# Patient Record
Sex: FEMALE | Race: WHITE | URBAN - METROPOLITAN AREA
[De-identification: names, ages, dates, MRNs, and addresses within clinical notes are randomized per-mention and may not be internally consistent; named-entity substitution may affect disease eponyms.]

---

## 2017-04-27 RX ORDER — TRAMADOL HYDROCHLORIDE 50 MG/1
TABLET ORAL
Qty: 120 TABLET | Refills: 0 | Status: SHIPPED | OUTPATIENT
Start: 2017-04-27 | End: 2017-08-07 | Stop reason: SDUPTHER

## 2017-08-07 RX ORDER — CYCLOBENZAPRINE HCL 10 MG
10 TABLET ORAL 3 TIMES DAILY PRN
Qty: 30 TABLET | Refills: 2 | Status: SHIPPED | OUTPATIENT
Start: 2017-08-07 | End: 2017-10-24 | Stop reason: SDUPTHER

## 2017-08-07 RX ORDER — TRAMADOL HYDROCHLORIDE 50 MG/1
TABLET ORAL
Qty: 120 TABLET | Refills: 0 | Status: SHIPPED | OUTPATIENT
Start: 2017-08-07 | End: 2017-09-01 | Stop reason: SDUPTHER

## 2017-08-31 ENCOUNTER — TELEPHONE (OUTPATIENT)
Dept: FAMILY MEDICINE CLINIC | Age: 62
End: 2017-08-31

## 2017-09-05 RX ORDER — TRAMADOL HYDROCHLORIDE 50 MG/1
TABLET ORAL
Qty: 120 TABLET | Refills: 0 | Status: SHIPPED | OUTPATIENT
Start: 2017-09-05 | End: 2017-10-24 | Stop reason: SDUPTHER

## 2017-09-12 ENCOUNTER — OFFICE VISIT (OUTPATIENT)
Dept: FAMILY MEDICINE CLINIC | Age: 62
End: 2017-09-12
Payer: COMMERCIAL

## 2017-09-12 VITALS
DIASTOLIC BLOOD PRESSURE: 72 MMHG | HEIGHT: 70 IN | HEART RATE: 87 BPM | SYSTOLIC BLOOD PRESSURE: 134 MMHG | BODY MASS INDEX: 28.49 KG/M2 | WEIGHT: 199 LBS

## 2017-09-12 DIAGNOSIS — I10 ESSENTIAL HYPERTENSION: ICD-10-CM

## 2017-09-12 DIAGNOSIS — M79.7 FIBROMYALGIA: Primary | ICD-10-CM

## 2017-09-12 PROCEDURE — 99213 OFFICE O/P EST LOW 20 MIN: CPT | Performed by: FAMILY MEDICINE

## 2017-09-12 RX ORDER — LISINOPRIL AND HYDROCHLOROTHIAZIDE 25; 20 MG/1; MG/1
1 TABLET ORAL DAILY
Qty: 90 TABLET | Refills: 3 | Status: SHIPPED | OUTPATIENT
Start: 2017-09-12 | End: 2018-12-16 | Stop reason: SDUPTHER

## 2017-09-12 ASSESSMENT — PATIENT HEALTH QUESTIONNAIRE - PHQ9
SUM OF ALL RESPONSES TO PHQ QUESTIONS 1-9: 0
1. LITTLE INTEREST OR PLEASURE IN DOING THINGS: 0
2. FEELING DOWN, DEPRESSED OR HOPELESS: 0
SUM OF ALL RESPONSES TO PHQ9 QUESTIONS 1 & 2: 0

## 2017-09-12 ASSESSMENT — ENCOUNTER SYMPTOMS
SORE THROAT: 0
NAUSEA: 0
SINUS PRESSURE: 0
COUGH: 0
SHORTNESS OF BREATH: 0
BACK PAIN: 1
VOMITING: 0
DIARRHEA: 0

## 2017-10-25 RX ORDER — TRAMADOL HYDROCHLORIDE 50 MG/1
TABLET ORAL
Qty: 120 TABLET | Refills: 0 | Status: SHIPPED | OUTPATIENT
Start: 2017-10-25 | End: 2018-01-22 | Stop reason: SDUPTHER

## 2017-10-25 RX ORDER — CYCLOBENZAPRINE HCL 10 MG
10 TABLET ORAL 3 TIMES DAILY PRN
Qty: 30 TABLET | Refills: 2 | Status: SHIPPED | OUTPATIENT
Start: 2017-10-25 | End: 2019-10-17 | Stop reason: SDUPTHER

## 2018-01-23 DIAGNOSIS — M79.7 FIBROMYALGIA: ICD-10-CM

## 2018-01-23 RX ORDER — TRAMADOL HYDROCHLORIDE 50 MG/1
TABLET ORAL
Qty: 120 TABLET | Refills: 0 | Status: SHIPPED | OUTPATIENT
Start: 2018-01-23 | End: 2018-01-23 | Stop reason: SDUPTHER

## 2018-01-23 RX ORDER — TRAMADOL HYDROCHLORIDE 50 MG/1
TABLET ORAL
Qty: 120 TABLET | Refills: 0 | Status: SHIPPED | OUTPATIENT
Start: 2018-01-23 | End: 2018-01-24 | Stop reason: SDUPTHER

## 2018-01-23 NOTE — TELEPHONE ENCOUNTER
Last refill 11-6-2017   Health Maintenance   Topic Date Due    Hepatitis C screen  1955    HIV screen  03/05/1972    DTaP/Tdap/Td vaccine (1 - Tdap) 03/05/1976    Pneumococcal med risk (1 of 1 - PPSV23) 03/05/1976    Cervical cancer screen  03/05/1978    Breast cancer screen  03/05/2007    Colon cancer screen colonoscopy  03/05/2007    Smoker: low dose lung CT screening  03/05/2012    Zostavax vaccine  03/05/2017    Flu vaccine (1) 09/01/2017    Potassium monitoring  10/26/2017    Creatinine monitoring  10/26/2017    Lipid screen  10/26/2021       No results found for: LABA1C          ( goal A1C is < 7)   No results found for: LABMICR  LDL Cholesterol (mg/dL)   Date Value   10/26/2016 105       (goal LDL is <100)   AST (U/L)   Date Value   10/26/2016 21     ALT (U/L)   Date Value   10/26/2016 32     BUN (mg/dL)   Date Value   10/26/2016 11     BP Readings from Last 3 Encounters:   09/12/17 134/72   10/24/16 142/88          (goal 120/80)    All Future Testing planned in [facility]      Next Visit Date:  No future appointments.          Patient Active Problem List:     Essential hypertension     Fibromyalgia

## 2018-01-23 NOTE — TELEPHONE ENCOUNTER
Health Maintenance   Topic Date Due    Hepatitis C screen  1955    HIV screen  03/05/1972    DTaP/Tdap/Td vaccine (1 - Tdap) 03/05/1976    Pneumococcal med risk (1 of 1 - PPSV23) 03/05/1976    Cervical cancer screen  03/05/1978    Breast cancer screen  03/05/2007    Colon cancer screen colonoscopy  03/05/2007    Smoker: low dose lung CT screening  03/05/2012    Zostavax vaccine  03/05/2017    Flu vaccine (1) 09/01/2017    Potassium monitoring  10/26/2017    Creatinine monitoring  10/26/2017    Lipid screen  10/26/2021       No results found for: LABA1C          ( goal A1C is < 7)   No results found for: LABMICR  LDL Cholesterol (mg/dL)   Date Value   10/26/2016 105       (goal LDL is <100)   AST (U/L)   Date Value   10/26/2016 21     ALT (U/L)   Date Value   10/26/2016 32     BUN (mg/dL)   Date Value   10/26/2016 11     BP Readings from Last 3 Encounters:   09/12/17 134/72   10/24/16 142/88          (goal 120/80)    All Future Testing planned in [facility]      Next Visit Date:  No future appointments.          Patient Active Problem List:     Essential hypertension     Fibromyalgia

## 2018-01-24 DIAGNOSIS — M79.7 FIBROMYALGIA: ICD-10-CM

## 2018-01-24 RX ORDER — TRAMADOL HYDROCHLORIDE 50 MG/1
TABLET ORAL
Qty: 120 TABLET | Refills: 0 | Status: SHIPPED | OUTPATIENT
Start: 2018-01-24 | End: 2018-02-23

## 2018-05-02 DIAGNOSIS — M79.7 FIBROMYALGIA: ICD-10-CM

## 2018-05-02 RX ORDER — TRAMADOL HYDROCHLORIDE 50 MG/1
TABLET ORAL
Qty: 120 TABLET | Refills: 0 | Status: SHIPPED | OUTPATIENT
Start: 2018-05-02 | End: 2018-06-26 | Stop reason: SDUPTHER

## 2018-06-26 DIAGNOSIS — M79.7 FIBROMYALGIA: ICD-10-CM

## 2018-06-26 RX ORDER — TRAMADOL HYDROCHLORIDE 50 MG/1
TABLET ORAL
Qty: 120 TABLET | Refills: 0 | Status: SHIPPED | OUTPATIENT
Start: 2018-06-26 | End: 2018-07-26

## 2018-07-05 ENCOUNTER — TELEPHONE (OUTPATIENT)
Dept: FAMILY MEDICINE CLINIC | Age: 63
End: 2018-07-05

## 2018-09-14 ENCOUNTER — OFFICE VISIT (OUTPATIENT)
Dept: FAMILY MEDICINE CLINIC | Age: 63
End: 2018-09-14
Payer: COMMERCIAL

## 2018-09-14 VITALS
BODY MASS INDEX: 24.36 KG/M2 | HEART RATE: 100 BPM | HEIGHT: 71 IN | WEIGHT: 174 LBS | DIASTOLIC BLOOD PRESSURE: 92 MMHG | SYSTOLIC BLOOD PRESSURE: 140 MMHG

## 2018-09-14 DIAGNOSIS — Z23 IMMUNIZATION DUE: ICD-10-CM

## 2018-09-14 DIAGNOSIS — Z12.39 BREAST CANCER SCREENING: ICD-10-CM

## 2018-09-14 DIAGNOSIS — M79.7 FIBROMYALGIA: ICD-10-CM

## 2018-09-14 DIAGNOSIS — I10 ESSENTIAL HYPERTENSION: Primary | ICD-10-CM

## 2018-09-14 PROCEDURE — 90471 IMMUNIZATION ADMIN: CPT | Performed by: FAMILY MEDICINE

## 2018-09-14 PROCEDURE — 90688 IIV4 VACCINE SPLT 0.5 ML IM: CPT | Performed by: FAMILY MEDICINE

## 2018-09-14 PROCEDURE — 99213 OFFICE O/P EST LOW 20 MIN: CPT | Performed by: FAMILY MEDICINE

## 2018-09-14 RX ORDER — TRAMADOL HYDROCHLORIDE 50 MG/1
50 TABLET ORAL EVERY 6 HOURS PRN
COMMUNITY
End: 2018-09-28

## 2018-09-14 RX ORDER — CITALOPRAM 20 MG/1
20 TABLET ORAL EVERY EVENING
Qty: 30 TABLET | Refills: 3 | Status: SHIPPED | OUTPATIENT
Start: 2018-09-14 | End: 2020-09-10 | Stop reason: ALTCHOICE

## 2018-09-14 ASSESSMENT — PATIENT HEALTH QUESTIONNAIRE - PHQ9
2. FEELING DOWN, DEPRESSED OR HOPELESS: 0
SUM OF ALL RESPONSES TO PHQ QUESTIONS 1-9: 0
1. LITTLE INTEREST OR PLEASURE IN DOING THINGS: 0
SUM OF ALL RESPONSES TO PHQ9 QUESTIONS 1 & 2: 0
SUM OF ALL RESPONSES TO PHQ QUESTIONS 1-9: 0

## 2018-09-14 ASSESSMENT — ENCOUNTER SYMPTOMS
DIARRHEA: 0
COUGH: 0
VOMITING: 0
NAUSEA: 0
SINUS PRESSURE: 0
BACK PAIN: 1
SORE THROAT: 0
SHORTNESS OF BREATH: 0

## 2018-09-14 NOTE — PROGRESS NOTES
FRACTURE SURGERY      TUBAL LIGATION       Family History   Problem Relation Age of Onset    Cancer Mother     Heart Disease Mother     Stroke Father      Social History   Substance Use Topics    Smoking status: Current Every Day Smoker     Packs/day: 1.00     Types: Cigarettes    Smokeless tobacco: Never Used    Alcohol use No      Current Outpatient Prescriptions   Medication Sig Dispense Refill    traMADol (ULTRAM) 50 MG tablet Take 50 mg by mouth every 6 hours as needed for Pain. Fili Schaeffer citalopram (CELEXA) 20 MG tablet Take 1 tablet by mouth every evening 30 tablet 3    cyclobenzaprine (FLEXERIL) 10 MG tablet Take 1 tablet by mouth 3 times daily as needed for Muscle spasms 30 tablet 2    lisinopril-hydrochlorothiazide (PRINZIDE;ZESTORETIC) 20-25 MG per tablet Take 1 tablet by mouth daily 90 tablet 3     No current facility-administered medications for this visit. No Known Allergies      Subjective:   Review of Systems   Constitutional: Negative for chills, diaphoresis and fever. HENT: Negative for congestion, sinus pressure and sore throat. Eyes: Negative for visual disturbance. Respiratory: Negative for cough and shortness of breath. Cardiovascular: Negative for chest pain and leg swelling. Gastrointestinal: Negative for diarrhea, nausea and vomiting. Genitourinary: Negative for dysuria, menstrual problem, urgency and vaginal discharge. Musculoskeletal: Positive for back pain and gait problem. Negative for arthralgias and myalgias. Skin: Negative for rash. Neurological: Negative for weakness, numbness and headaches. Psychiatric/Behavioral: Positive for agitation, decreased concentration and sleep disturbance. The patient is nervous/anxious. Objective:   BP (!) 140/92 (Site: Left Upper Arm)   Pulse 100   Ht 5' 11\" (1.803 m)   Wt 174 lb (78.9 kg)   BMI 24.27 kg/m²     Physical Exam   Constitutional: She is oriented to person, place, and time.  She appears

## 2018-09-24 ENCOUNTER — TELEPHONE (OUTPATIENT)
Dept: FAMILY MEDICINE CLINIC | Age: 63
End: 2018-09-24

## 2018-09-28 ENCOUNTER — OFFICE VISIT (OUTPATIENT)
Dept: FAMILY MEDICINE CLINIC | Age: 63
End: 2018-09-28
Payer: COMMERCIAL

## 2018-09-28 VITALS
HEART RATE: 72 BPM | DIASTOLIC BLOOD PRESSURE: 70 MMHG | WEIGHT: 176 LBS | SYSTOLIC BLOOD PRESSURE: 120 MMHG | BODY MASS INDEX: 24.55 KG/M2

## 2018-09-28 DIAGNOSIS — F41.1 GAD (GENERALIZED ANXIETY DISORDER): ICD-10-CM

## 2018-09-28 DIAGNOSIS — I10 ESSENTIAL HYPERTENSION: Primary | ICD-10-CM

## 2018-09-28 DIAGNOSIS — M79.7 FIBROMYALGIA: ICD-10-CM

## 2018-09-28 PROCEDURE — 99213 OFFICE O/P EST LOW 20 MIN: CPT | Performed by: FAMILY MEDICINE

## 2018-09-28 RX ORDER — LORAZEPAM 0.5 MG/1
TABLET ORAL
Qty: 20 TABLET | Refills: 0 | Status: SHIPPED | OUTPATIENT
Start: 2018-09-28 | End: 2018-10-12 | Stop reason: SDUPTHER

## 2018-09-28 RX ORDER — LORAZEPAM 0.5 MG/1
0.5 TABLET ORAL 2 TIMES DAILY
COMMUNITY
End: 2018-09-28 | Stop reason: ALTCHOICE

## 2018-09-28 ASSESSMENT — ENCOUNTER SYMPTOMS
BACK PAIN: 0
SORE THROAT: 0
DIARRHEA: 0
NAUSEA: 0
SHORTNESS OF BREATH: 0
SINUS PRESSURE: 0
COUGH: 0
VOMITING: 0

## 2018-09-28 NOTE — PROGRESS NOTES
vaccine  Completed       Past Medical History:   Diagnosis Date    Fibromyalgia     Hypertension       Past Surgical History:   Procedure Laterality Date    FRACTURE SURGERY      TUBAL LIGATION       Family History   Problem Relation Age of Onset    Cancer Mother     Heart Disease Mother     Stroke Father      Social History   Substance Use Topics    Smoking status: Current Every Day Smoker     Packs/day: 1.00     Types: Cigarettes    Smokeless tobacco: Never Used    Alcohol use No      Current Outpatient Prescriptions   Medication Sig Dispense Refill    LORazepam (ATIVAN) 0.5 MG tablet 1/2 to 1 daily as needed for anxiety. 20 tablet 0    citalopram (CELEXA) 20 MG tablet Take 1 tablet by mouth every evening 30 tablet 3    cyclobenzaprine (FLEXERIL) 10 MG tablet Take 1 tablet by mouth 3 times daily as needed for Muscle spasms 30 tablet 2    lisinopril-hydrochlorothiazide (PRINZIDE;ZESTORETIC) 20-25 MG per tablet Take 1 tablet by mouth daily 90 tablet 3     No current facility-administered medications for this visit. No Known Allergies      Subjective:   Review of Systems   Constitutional: Negative for chills, diaphoresis and fever. HENT: Negative for congestion, sinus pressure and sore throat. Eyes: Negative for visual disturbance. Respiratory: Negative for cough and shortness of breath. Cardiovascular: Negative for chest pain and leg swelling. Gastrointestinal: Negative for diarrhea, nausea and vomiting. Genitourinary: Negative for dysuria, menstrual problem, urgency and vaginal discharge. Musculoskeletal: Negative for arthralgias, back pain and myalgias. Skin: Negative for rash. Neurological: Negative for weakness, numbness and headaches. Psychiatric/Behavioral: Negative for sleep disturbance. Objective:   /70   Pulse 72   Wt 176 lb (79.8 kg)   BMI 24.55 kg/m²     Physical Exam   Constitutional: She is oriented to person, place, and time.  She appears well-developed and well-nourished. No distress. HENT:   Head: Normocephalic and atraumatic. Mouth/Throat: No oropharyngeal exudate. Eyes: No scleral icterus. Neck: Neck supple. Carotid bruit is not present. Cardiovascular: Exam reveals no gallop and no friction rub. No murmur heard. Pulmonary/Chest: No respiratory distress. She has no wheezes. She has no rales. She exhibits no tenderness. Musculoskeletal: She exhibits no edema. Lymphadenopathy:     She has no cervical adenopathy. Neurological: She is alert and oriented to person, place, and time. No cranial nerve deficit. Skin: No rash noted. She is not diaphoretic. Psychiatric: She has a normal mood and affect. Her behavior is normal. Judgment and thought content normal.       Assessment:       Diagnosis Orders   1. Essential hypertension     2. PATRICIO (generalized anxiety disorder)  LORazepam (ATIVAN) 0.5 MG tablet   3. Fibromyalgia           Plan:      No Follow-up on file. No orders of the defined types were placed in this encounter. Orders Placed This Encounter   Medications    LORazepam (ATIVAN) 0.5 MG tablet     Si/2 to 1 daily as needed for anxiety. Dispense:  20 tablet     Refill:  0     Encourage lifestyle changes. Track down and review [facility] records.   [facility] paper work

## 2018-10-12 DIAGNOSIS — F41.1 GAD (GENERALIZED ANXIETY DISORDER): ICD-10-CM

## 2018-10-12 RX ORDER — LORAZEPAM 0.5 MG/1
TABLET ORAL
Qty: 20 TABLET | Refills: 0 | Status: SHIPPED | OUTPATIENT
Start: 2018-10-12 | End: 2018-10-25 | Stop reason: SDUPTHER

## 2018-10-25 ENCOUNTER — OFFICE VISIT (OUTPATIENT)
Dept: FAMILY MEDICINE CLINIC | Age: 63
End: 2018-10-25
Payer: COMMERCIAL

## 2018-10-25 VITALS
BODY MASS INDEX: 25.24 KG/M2 | SYSTOLIC BLOOD PRESSURE: 148 MMHG | DIASTOLIC BLOOD PRESSURE: 80 MMHG | WEIGHT: 181 LBS | HEART RATE: 77 BPM

## 2018-10-25 DIAGNOSIS — F41.1 GAD (GENERALIZED ANXIETY DISORDER): ICD-10-CM

## 2018-10-25 DIAGNOSIS — I10 ESSENTIAL HYPERTENSION: Primary | ICD-10-CM

## 2018-10-25 PROCEDURE — 99213 OFFICE O/P EST LOW 20 MIN: CPT | Performed by: FAMILY MEDICINE

## 2018-10-25 RX ORDER — LORAZEPAM 0.5 MG/1
TABLET ORAL
Qty: 60 TABLET | Refills: 0 | Status: SHIPPED | OUTPATIENT
Start: 2018-10-25 | End: 2018-11-24 | Stop reason: SDUPTHER

## 2018-10-25 RX ORDER — CITALOPRAM 40 MG/1
40 TABLET ORAL DAILY
Qty: 30 TABLET | Refills: 3 | Status: SHIPPED | OUTPATIENT
Start: 2018-10-25 | End: 2019-04-03 | Stop reason: SDUPTHER

## 2018-10-25 ASSESSMENT — ENCOUNTER SYMPTOMS
DIARRHEA: 0
NAUSEA: 0
COUGH: 0
SORE THROAT: 0
BACK PAIN: 0
SINUS PRESSURE: 0
SHORTNESS OF BREATH: 0
VOMITING: 0

## 2018-10-25 NOTE — PROGRESS NOTES
Corey Neely is a 66 y.o. female who presents todayfor her medical conditions/complaints as noted below. Corey Neely is c/o of Depression (follow up)    She feels somewhat better in that she has decreased her tobacco to two per day. She continues to have a lot of jiterriness that seems helped by ativan. She continues to have trouble motivating getting out of her house. HPI:     Visit Information    Have you changed or started any medications since your last visit including any over-the-counter medicines, vitamins, or herbal medicines? no   Are you having any side effects from any of your medications? -  no  Have you stopped taking any of your medications? Is so, why? -  no    Have you seen any other physician or provider since your last visit? No  Have you had any other diagnostic tests since your last visit? No  Have you been seen in the emergency room and/or had an admission to a hospital since we last saw you? No  Have you had your routine dental cleaning in the past 6 months? no    Have you activated your [employer] account? If not, what are your barriers?  No:      Patient Care Team:  Cassandra Aquino MD as PCP - General (Family Medicine)  Cassandra Aquino MD as PCP - [facility] Attributed Provider    Medical History Review  Past Medical, Family, and Social History reviewed and  contribute to the patient presenting condition    Health Maintenance   Topic Date Due    Hepatitis C screen  1955    HIV screen  03/05/1972    DTaP/Tdap/Td vaccine (1 - Tdap) 03/05/1976    Cervical cancer screen  03/05/1978    Breast cancer screen  03/05/2007    Shingles Vaccine (1 of 2 - 2 Dose Series) 03/05/2007    Colon cancer screen colonoscopy  03/05/2007    Potassium monitoring  10/26/2017    Creatinine monitoring  10/26/2017    Pneumococcal med risk (1 of 1 - PPSV23) 12/31/2019 (Originally 3/5/1976)    Lipid screen  10/26/2021    Flu vaccine  Completed       Past Medical History:   Diagnosis Date    lb (82.1 kg)   BMI 25.24 kg/m²     Physical Exam   Constitutional: She is oriented to person, place, and time. She appears well-developed and well-nourished. No distress. HENT:   Head: Normocephalic and atraumatic. Mouth/Throat: No oropharyngeal exudate. Eyes: No scleral icterus. Neck: Neck supple. Carotid bruit is not present. Cardiovascular: Exam reveals no gallop and no friction rub. No murmur heard. Pulmonary/Chest: No respiratory distress. She has no wheezes. She has no rales. She exhibits no tenderness. Musculoskeletal: She exhibits no edema. Lymphadenopathy:     She has no cervical adenopathy. Neurological: She is alert and oriented to person, place, and time. No cranial nerve deficit. Skin: No rash noted. She is not diaphoretic. Psychiatric: Her behavior is normal. Judgment and thought content normal. Her mood appears anxious. Assessment:       Diagnosis Orders   1. Essential hypertension     2. PATRICIO (generalized anxiety disorder)  LORazepam (ATIVAN) 0.5 MG tablet    citalopram (CELEXA) 40 MG tablet         Plan:      Return in about 3 months (around 2019). No orders of the defined types were placed in this encounter. Orders Placed This Encounter   Medications    LORazepam (ATIVAN) 0.5 MG tablet     Si/2 to 1 tab two times daily as needed for anxiety. Dispense:  60 tablet     Refill:  0    citalopram (CELEXA) 40 MG tablet     Sig: Take 1 tablet by mouth daily     Dispense:  30 tablet     Refill:  3     We will increase her citalopram to 40 mg and continue the ativan with hopes of eventually getting off of this. She is off tramadol. She is encouraged to completely stop tobacco and she will try.

## 2018-11-06 DIAGNOSIS — Z12.39 BREAST CANCER SCREENING: Primary | ICD-10-CM

## 2018-11-06 DIAGNOSIS — Z12.11 COLON CANCER SCREENING: Primary | ICD-10-CM

## 2018-11-24 DIAGNOSIS — F41.1 GAD (GENERALIZED ANXIETY DISORDER): ICD-10-CM

## 2018-11-26 RX ORDER — LORAZEPAM 0.5 MG/1
TABLET ORAL
Qty: 60 TABLET | Refills: 0 | Status: SHIPPED | OUTPATIENT
Start: 2018-11-26 | End: 2020-01-24

## 2018-11-26 NOTE — TELEPHONE ENCOUNTER
lv 10/25/18  Oars please review today      Next Visit Date:  Future Appointments  Date Time Provider Mikey Talamantes   1/29/2019 2:30 PM Ramakrishna Ryan  [address] [facility] Maintenance   Topic Date Due    Hepatitis C screen  1955    HIV screen  03/05/1972    DTaP/Tdap/Td vaccine (1 - Tdap) 03/05/1976    Cervical cancer screen  03/05/1978    Breast cancer screen  03/05/2007    Shingles Vaccine (1 of 2 - 2 Dose Series) 03/05/2007    Colon cancer screen colonoscopy  03/05/2007    Potassium monitoring  10/26/2017    Creatinine monitoring  10/26/2017    Pneumococcal med risk (1 of 1 - PPSV23) 12/31/2019 (Originally 3/5/1976)    Lipid screen  10/26/2021    Flu vaccine  Completed       No results found for: LABA1C          ( goal A1C is < 7)   No results found for: LABMICR  LDL Cholesterol (mg/dL)   Date Value   10/26/2016 105       (goal LDL is <100)   AST (U/L)   Date Value   10/26/2016 21     ALT (U/L)   Date Value   10/26/2016 32     BUN (mg/dL)   Date Value   10/26/2016 11     BP Readings from Last 3 Encounters:   10/25/18 (!) 148/80   09/28/18 120/70   09/14/18 (!) 140/92          (goal 120/80)    All Future Testing planned in [facility]  Lab Frequency Next Occurrence   JOSÉ ANTONIO DIGITAL SCREEN SELF REFERRAL W OR WO CAD BILATERAL Once 09/14/2018               Patient Active Problem List:     Essential hypertension     Fibromyalgia

## 2018-12-17 RX ORDER — LISINOPRIL AND HYDROCHLOROTHIAZIDE 25; 20 MG/1; MG/1
TABLET ORAL
Qty: 90 TABLET | Refills: 3 | Status: SHIPPED | OUTPATIENT
Start: 2018-12-17 | End: 2020-04-09

## 2019-04-03 DIAGNOSIS — F41.1 GAD (GENERALIZED ANXIETY DISORDER): ICD-10-CM

## 2019-04-03 NOTE — TELEPHONE ENCOUNTER
Last visit: 10/25/18  Next Visit Date:  No future appointments.     Health Maintenance   Topic Date Due    Hepatitis C screen  1955    Pneumococcal 0-64 years at Risk Vaccine (1 of 1 - PPSV23) 03/05/1963    HIV screen  03/05/1972    DTaP/Tdap/Td vaccine (1 - Tdap) 03/05/1976    Cervical cancer screen  03/05/1978    Breast cancer screen  03/05/2007    Shingles Vaccine (1 of 2) 03/05/2007    Colon cancer screen colonoscopy  03/05/2007    Potassium monitoring  10/26/2017    Creatinine monitoring  10/26/2017    Lipid screen  10/26/2021    Flu vaccine  Completed       No results found for: LABA1C          ( goal A1C is < 7)   No results found for: LABMICR  LDL Cholesterol (mg/dL)   Date Value   10/26/2016 105       (goal LDL is <100)   AST (U/L)   Date Value   10/26/2016 21     ALT (U/L)   Date Value   10/26/2016 32     BUN (mg/dL)   Date Value   10/26/2016 11     BP Readings from Last 3 Encounters:   10/25/18 (!) 148/80   09/28/18 120/70   09/14/18 (!) 140/92          (goal 120/80)    All Future Testing planned in [facility]  Lab Frequency Next Occurrence   JOSÉ ANTONIO DIGITAL SCREEN SELF REFERRAL W OR WO CAD BILATERAL Once 09/14/2018               Patient Active Problem List:     Essential hypertension     Fibromyalgia

## 2019-04-04 RX ORDER — CITALOPRAM 40 MG/1
TABLET ORAL
Qty: 30 TABLET | Refills: 3 | Status: SHIPPED | OUTPATIENT
Start: 2019-04-04 | End: 2019-10-17 | Stop reason: SDUPTHER

## 2019-07-22 ENCOUNTER — TELEPHONE (OUTPATIENT)
Dept: FAMILY MEDICINE CLINIC | Age: 64
End: 2019-07-22

## 2019-07-22 DIAGNOSIS — Z12.39 BREAST CANCER SCREENING: Primary | ICD-10-CM

## 2019-10-17 DIAGNOSIS — F41.1 GAD (GENERALIZED ANXIETY DISORDER): ICD-10-CM

## 2019-10-18 RX ORDER — CYCLOBENZAPRINE HCL 10 MG
TABLET ORAL
Qty: 30 TABLET | Refills: 2 | Status: SHIPPED | OUTPATIENT
Start: 2019-10-18 | End: 2020-10-28

## 2019-10-18 RX ORDER — CITALOPRAM 40 MG/1
TABLET ORAL
Qty: 30 TABLET | Refills: 3 | Status: SHIPPED | OUTPATIENT
Start: 2019-10-18 | End: 2020-04-09

## 2020-04-09 RX ORDER — CITALOPRAM 40 MG/1
TABLET ORAL
Qty: 30 TABLET | Refills: 0 | Status: SHIPPED | OUTPATIENT
Start: 2020-04-09 | End: 2020-06-29

## 2020-04-09 RX ORDER — LISINOPRIL AND HYDROCHLOROTHIAZIDE 25; 20 MG/1; MG/1
TABLET ORAL
Qty: 90 TABLET | Refills: 0 | Status: SHIPPED | OUTPATIENT
Start: 2020-04-09 | End: 2021-03-16 | Stop reason: SDUPTHER

## 2020-06-29 RX ORDER — CITALOPRAM 40 MG/1
TABLET ORAL
Qty: 30 TABLET | Refills: 0 | Status: SHIPPED | OUTPATIENT
Start: 2020-06-29 | End: 2020-08-21 | Stop reason: SDUPTHER

## 2020-06-29 NOTE — TELEPHONE ENCOUNTER
Next Visit Date:  No future appointments.     Health Maintenance   Topic Date Due    Hepatitis C screen  1955    Pneumococcal 0-64 years Vaccine (1 of 1 - PPSV23) 03/05/1963    HIV screen  03/05/1972    DTaP/Tdap/Td vaccine (1 - Tdap) 03/05/1976    Cervical cancer screen  03/05/1978    Breast cancer screen  03/05/2007    Shingles Vaccine (1 of 2) 03/05/2007    Colon cancer screen colonoscopy  03/05/2007    Potassium monitoring  10/26/2017    Creatinine monitoring  10/26/2017    Flu vaccine (Season Ended) 09/01/2020    Lipid screen  10/26/2021    Hepatitis A vaccine  Aged Out    Hepatitis B vaccine  Aged Out    Hib vaccine  Aged Out    Meningococcal (ACWY) vaccine  Aged Out       No results found for: LABA1C          ( goal A1C is < 7)   No results found for: LABMICR  LDL Cholesterol (mg/dL)   Date Value   10/26/2016 105       (goal LDL is <100)   AST (U/L)   Date Value   10/26/2016 21     ALT (U/L)   Date Value   10/26/2016 32     BUN (mg/dL)   Date Value   10/26/2016 11     BP Readings from Last 3 Encounters:   10/25/18 (!) 148/80   09/28/18 120/70   09/14/18 (!) 140/92          (goal 120/80)    All Future Testing planned in [facility]  Lab Frequency Next Occurrence   JOSÉ ANTONIO DIGITAL SCREEN W CAD BILATERAL Once 08/21/2019               Patient Active Problem List:     Essential hypertension     Fibromyalgia

## 2020-08-21 RX ORDER — CITALOPRAM 40 MG/1
40 TABLET ORAL DAILY
Qty: 30 TABLET | Refills: 2 | Status: SHIPPED | OUTPATIENT
Start: 2020-08-21 | End: 2021-03-09 | Stop reason: SDUPTHER

## 2020-08-21 NOTE — TELEPHONE ENCOUNTER
Abraham Sanchez is calling to request a refill on the following medication(s):    Medication Request:  Requested Prescriptions      No prescriptions requested or ordered in this encounter       Last Visit Date (If Applicable):  17/80/1876    Next Visit Date:    9/10/2020

## 2020-09-10 ENCOUNTER — OFFICE VISIT (OUTPATIENT)
Dept: FAMILY MEDICINE CLINIC | Age: 65
End: 2020-09-10
Payer: COMMERCIAL

## 2020-09-10 VITALS
HEART RATE: 73 BPM | DIASTOLIC BLOOD PRESSURE: 70 MMHG | HEIGHT: 71 IN | TEMPERATURE: 97.7 F | OXYGEN SATURATION: 98 % | SYSTOLIC BLOOD PRESSURE: 130 MMHG | WEIGHT: 195.2 LBS | BODY MASS INDEX: 27.33 KG/M2

## 2020-09-10 PROCEDURE — 99213 OFFICE O/P EST LOW 20 MIN: CPT | Performed by: FAMILY MEDICINE

## 2020-09-10 PROCEDURE — 90686 IIV4 VACC NO PRSV 0.5 ML IM: CPT | Performed by: FAMILY MEDICINE

## 2020-09-10 PROCEDURE — 90472 IMMUNIZATION ADMIN EACH ADD: CPT | Performed by: FAMILY MEDICINE

## 2020-09-10 PROCEDURE — 90732 PPSV23 VACC 2 YRS+ SUBQ/IM: CPT | Performed by: FAMILY MEDICINE

## 2020-09-10 PROCEDURE — 90715 TDAP VACCINE 7 YRS/> IM: CPT | Performed by: FAMILY MEDICINE

## 2020-09-10 PROCEDURE — 90471 IMMUNIZATION ADMIN: CPT | Performed by: FAMILY MEDICINE

## 2020-09-10 SDOH — ECONOMIC STABILITY: TRANSPORTATION INSECURITY
IN THE PAST 12 MONTHS, HAS LACK OF TRANSPORTATION KEPT YOU FROM MEETINGS, WORK, OR FROM GETTING THINGS NEEDED FOR DAILY LIVING?: NO

## 2020-09-10 SDOH — ECONOMIC STABILITY: FOOD INSECURITY: WITHIN THE PAST 12 MONTHS, THE FOOD YOU BOUGHT JUST DIDN'T LAST AND YOU DIDN'T HAVE MONEY TO GET MORE.: NEVER TRUE

## 2020-09-10 SDOH — ECONOMIC STABILITY: FOOD INSECURITY: WITHIN THE PAST 12 MONTHS, YOU WORRIED THAT YOUR FOOD WOULD RUN OUT BEFORE YOU GOT MONEY TO BUY MORE.: NEVER TRUE

## 2020-09-10 SDOH — ECONOMIC STABILITY: INCOME INSECURITY: HOW HARD IS IT FOR YOU TO PAY FOR THE VERY BASICS LIKE FOOD, HOUSING, MEDICAL CARE, AND HEATING?: NOT HARD AT ALL

## 2020-09-10 SDOH — ECONOMIC STABILITY: TRANSPORTATION INSECURITY
IN THE PAST 12 MONTHS, HAS THE LACK OF TRANSPORTATION KEPT YOU FROM MEDICAL APPOINTMENTS OR FROM GETTING MEDICATIONS?: NO

## 2020-09-10 ASSESSMENT — ENCOUNTER SYMPTOMS
VOMITING: 0
SHORTNESS OF BREATH: 0
COUGH: 0
DIARRHEA: 0
SINUS PRESSURE: 0
BACK PAIN: 0
NAUSEA: 0
SORE THROAT: 0

## 2020-09-10 ASSESSMENT — PATIENT HEALTH QUESTIONNAIRE - PHQ9
2. FEELING DOWN, DEPRESSED OR HOPELESS: 0
1. LITTLE INTEREST OR PLEASURE IN DOING THINGS: 0
SUM OF ALL RESPONSES TO PHQ QUESTIONS 1-9: 0
SUM OF ALL RESPONSES TO PHQ9 QUESTIONS 1 & 2: 0
SUM OF ALL RESPONSES TO PHQ QUESTIONS 1-9: 0

## 2020-09-10 NOTE — PROGRESS NOTES
headaches. Psychiatric/Behavioral: Negative for sleep disturbance.       :   /70   Pulse 73   Temp 97.7 °F (36.5 °C)   Ht 5' 10.98\" (1.803 m)   Wt 195 lb 3.2 oz (88.5 kg)   SpO2 98%   BMI 27.24 kg/m²     Physical Exam  Constitutional:       General: She is not in acute distress. Appearance: She is well-developed. She is not diaphoretic. HENT:      Head: Normocephalic and atraumatic. Mouth/Throat:      Pharynx: No oropharyngeal exudate. Eyes:      General: No scleral icterus. Neck:      Musculoskeletal: Neck supple. Vascular: No carotid bruit. Cardiovascular:      Heart sounds: No murmur. No friction rub. No gallop. Pulmonary:      Effort: No respiratory distress. Breath sounds: No wheezing or rales. Chest:      Chest wall: No tenderness. Musculoskeletal:      Right lower leg: Edema present. Left lower leg: Edema (trace ankle edema both legs.) present. Lymphadenopathy:      Cervical: No cervical adenopathy. Skin:     Findings: No rash. Neurological:      Mental Status: She is alert and oriented to person, place, and time. Cranial Nerves: No cranial nerve deficit. Psychiatric:         Behavior: Behavior normal.         Thought Content: Thought content normal.         Judgment: Judgment normal.         Assessment:       Diagnosis Orders   1. Essential hypertension  CBC Auto Differential    Comprehensive Metabolic Panel    Lipid Panel         Plan:      No follow-ups on file.     Orders Placed This Encounter   Procedures    Tdap (age 6y and older) IM (BOOSTRIX)    INFLUENZA, QUADV, 3 YRS AND OLDER, IM PF, PREFILL SYR OR SDV, 0.5ML (AFLURIA QUADV, PF)    PNEUMOVAX 23 subcutaneous/IM (Pneumococcal polysaccharide vaccine 23-valent >= 1yo)    CBC Auto Differential     Standing Status:   Future     Standing Expiration Date:   9/10/2021    Comprehensive Metabolic Panel     Standing Status:   Future     Standing Expiration Date:   9/10/2021    Lipid Panel Standing Status:   Future     Standing Expiration Date:   9/10/2021     Order Specific Question:   Is Patient Fasting?/# of Hours     Answer:   12 hour fast     No orders of the defined types were placed in this encounter. Immunization updates. Labs fasting   She will follow up as needed with my replacement unless her labs indicate any needed follow up.

## 2020-10-27 NOTE — TELEPHONE ENCOUNTER
Efren Mcginnis is calling to request a refill on the following medication(s):    Medication Request:  Requested Prescriptions     Pending Prescriptions Disp Refills    cyclobenzaprine (FLEXERIL) 10 MG tablet [Pharmacy Med Name: Cyclobenzaprine HCl 10 MG Oral Tablet] 30 tablet 0     Sig: Take 1 tablet by mouth three times daily as needed for muscle spasm       Last Visit Date (If Applicable):  4/65/4333    Next Visit Date:    Visit date not found

## 2020-10-28 RX ORDER — CYCLOBENZAPRINE HCL 10 MG
TABLET ORAL
Qty: 30 TABLET | Refills: 0 | Status: SHIPPED | OUTPATIENT
Start: 2020-10-28 | End: 2021-03-09 | Stop reason: SDUPTHER

## 2021-03-08 DIAGNOSIS — F41.1 GAD (GENERALIZED ANXIETY DISORDER): ICD-10-CM

## 2021-03-08 DIAGNOSIS — M79.7 FIBROMYALGIA: Primary | ICD-10-CM

## 2021-03-08 NOTE — TELEPHONE ENCOUNTER
Rosario Salvador is calling to request a refill on the following medication(s):    Patient requesting generic - she doesn't have insurance right now     Medication Request:  Requested Prescriptions     Pending Prescriptions Disp Refills    citalopram (CELEXA) 40 MG tablet 30 tablet 2     Sig: Take 1 tablet by mouth daily    cyclobenzaprine (FLEXERIL) 10 MG tablet 30 tablet 0       Last Visit Date (If Applicable):  0/37/7805    Next Visit Date:    6/16/2021

## 2021-03-09 RX ORDER — CITALOPRAM 40 MG/1
40 TABLET ORAL DAILY
Qty: 30 TABLET | Refills: 2 | Status: SHIPPED | OUTPATIENT
Start: 2021-03-09 | End: 2021-12-13

## 2021-03-09 RX ORDER — CYCLOBENZAPRINE HCL 10 MG
10 TABLET ORAL 3 TIMES DAILY PRN
Qty: 30 TABLET | Refills: 0 | Status: SHIPPED | OUTPATIENT
Start: 2021-03-09 | End: 2021-09-20

## 2021-03-16 RX ORDER — LISINOPRIL AND HYDROCHLOROTHIAZIDE 25; 20 MG/1; MG/1
TABLET ORAL
Qty: 90 TABLET | Refills: 0 | Status: SHIPPED | OUTPATIENT
Start: 2021-03-16 | End: 2022-04-25

## 2021-03-16 NOTE — TELEPHONE ENCOUNTER
Lula Hussein is calling to request a refill on the following medication(s):    Medication Request:  Requested Prescriptions     Pending Prescriptions Disp Refills    lisinopril-hydroCHLOROthiazide (PRINZIDE;ZESTORETIC) 20-25 MG per tablet 90 tablet 0     Sig: Take 1 tablet by mouth once daily       Last Visit Date (If Applicable):  6/44/8885    Next Visit Date:    Visit date not found

## 2021-06-16 ENCOUNTER — OFFICE VISIT (OUTPATIENT)
Dept: FAMILY MEDICINE CLINIC | Age: 66
End: 2021-06-16
Payer: COMMERCIAL

## 2021-06-16 VITALS
SYSTOLIC BLOOD PRESSURE: 130 MMHG | DIASTOLIC BLOOD PRESSURE: 72 MMHG | OXYGEN SATURATION: 98 % | BODY MASS INDEX: 27.49 KG/M2 | WEIGHT: 197 LBS | HEART RATE: 72 BPM

## 2021-06-16 DIAGNOSIS — M25.552 BILATERAL HIP PAIN: ICD-10-CM

## 2021-06-16 DIAGNOSIS — F41.1 GAD (GENERALIZED ANXIETY DISORDER): ICD-10-CM

## 2021-06-16 DIAGNOSIS — G89.29 CHRONIC BILATERAL LOW BACK PAIN WITHOUT SCIATICA: ICD-10-CM

## 2021-06-16 DIAGNOSIS — I10 ESSENTIAL HYPERTENSION: Primary | ICD-10-CM

## 2021-06-16 DIAGNOSIS — M79.7 FIBROMYALGIA: ICD-10-CM

## 2021-06-16 DIAGNOSIS — M25.551 BILATERAL HIP PAIN: ICD-10-CM

## 2021-06-16 DIAGNOSIS — M54.50 CHRONIC BILATERAL LOW BACK PAIN WITHOUT SCIATICA: ICD-10-CM

## 2021-06-16 PROBLEM — Z12.31 ENCOUNTER FOR SCREENING MAMMOGRAM FOR BREAST CANCER: Status: ACTIVE | Noted: 2021-06-16

## 2021-06-16 PROBLEM — Z12.31 ENCOUNTER FOR SCREENING MAMMOGRAM FOR BREAST CANCER: Status: RESOLVED | Noted: 2021-06-16 | Resolved: 2021-06-16

## 2021-06-16 PROCEDURE — 99214 OFFICE O/P EST MOD 30 MIN: CPT | Performed by: FAMILY MEDICINE

## 2021-06-16 ASSESSMENT — PATIENT HEALTH QUESTIONNAIRE - PHQ9
SUM OF ALL RESPONSES TO PHQ9 QUESTIONS 1 & 2: 0
2. FEELING DOWN, DEPRESSED OR HOPELESS: 0
SUM OF ALL RESPONSES TO PHQ QUESTIONS 1-9: 0
SUM OF ALL RESPONSES TO PHQ QUESTIONS 1-9: 0
1. LITTLE INTEREST OR PLEASURE IN DOING THINGS: 0
SUM OF ALL RESPONSES TO PHQ QUESTIONS 1-9: 0

## 2021-06-16 ASSESSMENT — ENCOUNTER SYMPTOMS: BACK PAIN: 1

## 2021-06-16 NOTE — PROGRESS NOTES
APSO Progress Note    Date:6/16/2021         Patient Ghazal Mistry     YOB: 1955     Age:66 y.o. Assessment/Plan        Problem List Items Addressed This Visit        Circulatory    Essential hypertension - Primary      Well-controlled, continue current medications, medication adherence emphasized and lifestyle modifications recommended            Other    Fibromyalgia      Borderline controlled, continue current medications, medication adherence emphasized and lifestyle modifications recommended   Flexeril prn  PT         PATRICIO (generalized anxiety disorder)     Stable           Other Visit Diagnoses     Chronic bilateral low back pain without sciatica        Relevant Orders    [facility]    XR LUMBAR SPINE (2-3 VIEWS)    Bilateral hip pain        Relevant Orders    [facility]    XR HIP LEFT (2-3 VIEWS)    XR HIP RIGHT (2-3 VIEWS)           Return in about 1 month (around 7/16/2021). Electronically signed by Jorge Ojeda DO on 6/16/21         Subjective     Victorina Galindo is a 61 y.o. female presenting today for   Chief Complaint   Patient presents with    Establish Care    Hip Pain    Back Pain   . Victorina Galindo is a 61 y.o. female who presents for follow up of anxiety disorder. Current symptoms: none. She denies current suicidal and homicidal ideation. She complains of the following side effects from the treatment: none. In 35s her chiro did back xrays and said she had curved spine. Injured back lifting a bunch of weight in late 80s had DDD and herniated disc    Back Pain  This is a chronic problem. The current episode started more than 1 year ago. The problem occurs intermittently. The problem has been waxing and waning since onset. The pain is present in the lumbar spine. Radiates to: right and left hips.  Pertinent negatives include no abdominal pain, bladder incontinence, bowel incontinence, chest pain, dysuria, fever, headaches, leg pain, numbness, paresis, paresthesias, pelvic pain, perianal numbness, tingling, weakness or weight loss. Hip Pain   Pertinent negatives include no numbness or tingling. Hypertension  This is a chronic problem. The current episode started more than 1 year ago. The problem has been gradually improving since onset. The problem is controlled. Associated symptoms include anxiety. Pertinent negatives include no blurred vision, chest pain, headaches, malaise/fatigue, neck pain, orthopnea, palpitations, peripheral edema, PND, shortness of breath or sweats. Past treatments include ACE inhibitors and diuretics. The current treatment provides significant improvement. Review of Systems   Review of Systems   Constitutional: Negative. Negative for fever, malaise/fatigue and weight loss. HENT: Negative. Eyes: Negative. Negative for blurred vision. Respiratory: Negative. Negative for shortness of breath. Cardiovascular: Negative. Negative for chest pain, palpitations, orthopnea and PND. Gastrointestinal: Negative. Negative for abdominal pain and bowel incontinence. Endocrine: Negative. Genitourinary: Negative. Negative for bladder incontinence, dysuria and pelvic pain. Musculoskeletal: Positive for back pain. Negative for neck pain. Skin: Negative. Allergic/Immunologic: Negative. Neurological: Negative. Negative for tingling, weakness, numbness, headaches and paresthesias. Hematological: Negative. Psychiatric/Behavioral: Negative. All other systems reviewed and are negative.       Medications     Current Outpatient Medications   Medication Sig Dispense Refill    lisinopril-hydroCHLOROthiazide (PRINZIDE;ZESTORETIC) 20-25 MG per tablet Take 1 tablet by mouth once daily 90 tablet 0    cyclobenzaprine (FLEXERIL) 10 MG tablet Take 1 tablet by mouth 3 times daily as needed for Muscle spasms 30 tablet 0    citalopram (CELEXA) 40 MG tablet Take 1 tablet by mouth daily 30 tablet 2     No current facility-administered medications for this visit. Past History    Past Medical History:   has a past medical history of Fibromyalgia, PATRICIO (generalized anxiety disorder), and Hypertension. Social History:   reports that she has been smoking cigarettes. She has a 60.00 pack-year smoking history. She has never used smokeless tobacco. She reports that she does not drink alcohol and does not use drugs. Family History:   Family History   Problem Relation Age of Onset   Bayron Wu Cancer Mother     Heart Disease Mother     Stroke Father        Surgical History:   Past Surgical History:   Procedure Laterality Date    FRACTURE SURGERY      TUBAL LIGATION          Physical Examination      Vitals:  /72 (Site: Right Upper Arm, Position: Sitting, Cuff Size: Small Adult)   Pulse 72   Wt 197 lb (89.4 kg)   SpO2 98%   BMI 27.49 kg/m²     Physical Exam  Vitals and nursing note reviewed. Constitutional:       General: She is not in acute distress. Appearance: Normal appearance. She is normal weight. She is not ill-appearing, toxic-appearing or diaphoretic. HENT:      Head: Normocephalic and atraumatic. Eyes:      General: No scleral icterus. Right eye: No discharge. Left eye: No discharge. Extraocular Movements: Extraocular movements intact. Conjunctiva/sclera: Conjunctivae normal.   Cardiovascular:      Rate and Rhythm: Normal rate and regular rhythm. Pulses: Normal pulses. Heart sounds: Normal heart sounds. No murmur heard. No friction rub. No gallop. Pulmonary:      Effort: Pulmonary effort is normal. No respiratory distress. Breath sounds: Normal breath sounds. No stridor. No wheezing, rhonchi or rales. Chest:      Chest wall: No tenderness. Musculoskeletal:      Lumbar back: Tenderness present. Right hip: Tenderness present. Decreased range of motion. Left hip: Tenderness present. Decreased range of motion. Neurological:      Mental Status: She is alert and oriented to person, place, and time. Mental status is at baseline. Deep Tendon Reflexes:      Reflex Scores:       Patellar reflexes are 2+ on the right side and 2+ on the left side. Achilles reflexes are 2+ on the right side and 2+ on the left side. Psychiatric:         Mood and Affect: Mood normal.         Behavior: Behavior normal.         Thought Content: Thought content normal.         Judgment: Judgment normal.         Labs/Imaging/Diagnostics   Labs:  No results found for: CMPWITHGFR, CBCAUTODIF, TSHFT4, LABA1C, LIPIDPAN    Imaging Last 24 Hours:  No image results found.

## 2021-06-23 ENCOUNTER — TELEPHONE (OUTPATIENT)
Dept: FAMILY MEDICINE CLINIC | Age: 66
End: 2021-06-23

## 2021-06-23 NOTE — TELEPHONE ENCOUNTER
Pt called to say that her insurance does not cover physical therapy.   She is going to find out how much it costs to see if she can afford it

## 2021-06-29 ASSESSMENT — ENCOUNTER SYMPTOMS
BLURRED VISION: 0
EYES NEGATIVE: 1
ALLERGIC/IMMUNOLOGIC NEGATIVE: 1
SHORTNESS OF BREATH: 0
ORTHOPNEA: 0
RESPIRATORY NEGATIVE: 1
GASTROINTESTINAL NEGATIVE: 1
ABDOMINAL PAIN: 0
BOWEL INCONTINENCE: 0

## 2021-09-17 DIAGNOSIS — M79.7 FIBROMYALGIA: ICD-10-CM

## 2021-09-17 NOTE — TELEPHONE ENCOUNTER
Reynaldo Arana is calling to request a refill on the following medication(s):    Medication Request:  Requested Prescriptions     Pending Prescriptions Disp Refills    cyclobenzaprine (FLEXERIL) 10 MG tablet [Pharmacy Med Name: CYCLOBENZAPRINE 10 MG TABLET] 30 tablet 0     Sig: TAKE 1 TABLET BY MOUTH THREE TIMES A DAY AS NEEDED FOR MUSCLE SPASMS       Last Visit Date (If Applicable):  0/28/4232    Next Visit Date:    Visit date not found

## 2021-09-20 RX ORDER — CYCLOBENZAPRINE HCL 10 MG
TABLET ORAL
Qty: 30 TABLET | Refills: 0 | Status: SHIPPED | OUTPATIENT
Start: 2021-09-20 | End: 2021-11-10

## 2021-11-10 DIAGNOSIS — M79.7 FIBROMYALGIA: ICD-10-CM

## 2021-11-10 RX ORDER — CYCLOBENZAPRINE HCL 10 MG
TABLET ORAL
Qty: 30 TABLET | Refills: 0 | Status: SHIPPED | OUTPATIENT
Start: 2021-11-10 | End: 2021-12-13

## 2021-11-10 NOTE — TELEPHONE ENCOUNTER
Jody Henry is calling to request a refill on the following medication(s):    Medication Request:  Requested Prescriptions     Pending Prescriptions Disp Refills    cyclobenzaprine (FLEXERIL) 10 MG tablet [Pharmacy Med Name: CYCLOBENZAPRINE 10 MG TABLET] 30 tablet 0     Sig: TAKE 1 TABLET BY MOUTH THREE TIMES A DAY AS NEEDED FOR MUSCLE SPASMS       Last Visit Date (If Applicable):  3/47/9522    Next Visit Date:    Visit date not found

## 2021-11-17 ENCOUNTER — HOSPITAL ENCOUNTER (OUTPATIENT)
Age: 66
Discharge: HOME | End: 2021-11-19

## 2021-11-17 ENCOUNTER — HOSPITAL ENCOUNTER (OUTPATIENT)
Dept: GENERAL RADIOLOGY | Age: 66
Discharge: HOME | End: 2021-11-19

## 2021-11-17 ENCOUNTER — OFFICE VISIT (OUTPATIENT)
Dept: FAMILY MEDICINE CLINIC | Age: 66
End: 2021-11-17

## 2021-11-17 VITALS
WEIGHT: 195.4 LBS | OXYGEN SATURATION: 91 % | SYSTOLIC BLOOD PRESSURE: 138 MMHG | HEART RATE: 118 BPM | BODY MASS INDEX: 27.27 KG/M2 | DIASTOLIC BLOOD PRESSURE: 82 MMHG

## 2021-11-17 DIAGNOSIS — R06.02 SOB (SHORTNESS OF BREATH): Primary | ICD-10-CM

## 2021-11-17 DIAGNOSIS — I51.7 CARDIOMEGALY: ICD-10-CM

## 2021-11-17 DIAGNOSIS — J90 BILATERAL PLEURAL EFFUSION: ICD-10-CM

## 2021-11-17 DIAGNOSIS — F41.1 GAD (GENERALIZED ANXIETY DISORDER): Primary | ICD-10-CM

## 2021-11-17 DIAGNOSIS — J81.0 ACUTE PULMONARY EDEMA (HCC): ICD-10-CM

## 2021-11-17 DIAGNOSIS — R06.02 SOB (SHORTNESS OF BREATH): ICD-10-CM

## 2021-11-17 DIAGNOSIS — Z12.31 ENCOUNTER FOR SCREENING MAMMOGRAM FOR MALIGNANT NEOPLASM OF BREAST: ICD-10-CM

## 2021-11-17 DIAGNOSIS — Z12.12 ENCOUNTER FOR COLORECTAL CANCER SCREENING: ICD-10-CM

## 2021-11-17 DIAGNOSIS — Z12.11 ENCOUNTER FOR COLORECTAL CANCER SCREENING: ICD-10-CM

## 2021-11-17 PROCEDURE — 71046 X-RAY EXAM CHEST 2 VIEWS: CPT

## 2021-11-17 PROCEDURE — 99213 OFFICE O/P EST LOW 20 MIN: CPT | Performed by: FAMILY MEDICINE

## 2021-11-17 RX ORDER — BUSPIRONE HYDROCHLORIDE 5 MG/1
5 TABLET ORAL 2 TIMES DAILY
Qty: 60 TABLET | Refills: 0 | Status: SHIPPED | OUTPATIENT
Start: 2021-11-17 | End: 2021-12-13

## 2021-11-17 SDOH — ECONOMIC STABILITY: FOOD INSECURITY: WITHIN THE PAST 12 MONTHS, YOU WORRIED THAT YOUR FOOD WOULD RUN OUT BEFORE YOU GOT MONEY TO BUY MORE.: NEVER TRUE

## 2021-11-17 SDOH — ECONOMIC STABILITY: FOOD INSECURITY: WITHIN THE PAST 12 MONTHS, THE FOOD YOU BOUGHT JUST DIDN'T LAST AND YOU DIDN'T HAVE MONEY TO GET MORE.: NEVER TRUE

## 2021-11-17 ASSESSMENT — SOCIAL DETERMINANTS OF HEALTH (SDOH): HOW HARD IS IT FOR YOU TO PAY FOR THE VERY BASICS LIKE FOOD, HOUSING, MEDICAL CARE, AND HEATING?: NOT HARD AT ALL

## 2021-11-17 ASSESSMENT — ENCOUNTER SYMPTOMS: SHORTNESS OF BREATH: 1

## 2021-11-17 NOTE — ASSESSMENT & PLAN NOTE
Uncontrolled, changes made today: continue Celexa and add Buspar 5mg BID, medication adherence emphasized and lifestyle modifications recommended   Unlikely cardiac or lung related - EKG normal with EMS and VSS

## 2021-11-17 NOTE — PROGRESS NOTES
[facility] Progress Note    Date:11/17/2021         Patient Gilberto Anaya     Date of Birth:[birth year]     Age:66 y.o. Assessment/Plan        Problem List Items Addressed This Visit        Other    PATRICIO (generalized anxiety disorder) - Primary      Uncontrolled, changes made today: continue Celexa and add Buspar 5mg BID, medication adherence emphasized and lifestyle modifications recommended   Unlikely cardiac or lung related - EKG normal with EMS and VSS         Relevant Medications    busPIRone (BUSPAR) 5 MG tablet      Other Visit Diagnoses     Encounter for colorectal cancer screening        Relevant Orders    Cologuard (For External Results Only)    Encounter for screening mammogram for malignant neoplasm of breast        Relevant Orders    [facility] DIGITAL SCREEN W OR WO CAD BILATERAL    SOB (shortness of breath)        Relevant Orders    XR CHEST (2 VW)           Return in about 1 month (around 12/17/2021). Electronically signed by Glo Alexis DO on 11/17/21         Subjective     Bob Gardner is a 61 y.o. female presenting today for   Chief Complaint   Patient presents with    Shortness of Breath     feels like she is suffocating for the last week,  called 911 the squad checked her over and did not find any thing   . Bob Gardner is a 61 y.o. female who presents for follow up of anxiety disorder. Current symptoms: chest pain, shortness of breath. She denies current suicidal and homicidal ideation. She complains of the following side effects from the treatment: none. Woke up Friday night feeling like she couldn't breath. Called EMS and EKG was fine and vitals were good - advised it was anxiety. Nothing making her anxiety worse in her opinion. Did quit drinking one month ago and smoking cigarettes 1 week ago. Smoked a couple at a friends the other day and the SOB got better      Shortness of Breath        Review of Systems   Review of Systems   Respiratory: Positive for shortness of breath. sounds: Normal heart sounds. No murmur heard. No friction rub. No gallop. Pulmonary:      Effort: Pulmonary effort is normal. No respiratory distress. Breath sounds: Normal breath sounds. No stridor. No wheezing, rhonchi or rales. Chest:      Chest wall: No tenderness. Neurological:      Mental Status: She is alert and oriented to person, place, and time. Mental status is at baseline. Psychiatric:         Mood and Affect: Mood is anxious. Behavior: Behavior normal.         Thought Content: Thought content normal.         Judgment: Judgment normal.         Labs/Imaging/Diagnostics   Labs:  No results found for: CMPWITHGFR, CBCAUTODIF, TSHFT4, LABA1C, LIPIDPAN    Imaging Last 24 Hours:  No image results found.

## 2021-11-19 ENCOUNTER — NURSE TRIAGE (OUTPATIENT)
Dept: OTHER | Facility: CLINIC | Age: 66
End: 2021-11-19

## 2021-11-19 NOTE — TELEPHONE ENCOUNTER
Received call from Eren Hidalgo 28 at [facility] with [employer]. Brief description of triage: sob, has CHF, will see cardiology next week, also has bilateral pulmonary edema, is on Buspirone, states she is sob at rest, can't take the sob any longer is exhausted. Calling PCP for further advise for level of care, Called office spoke with Asher Laws, was unable to get advise from office. Triage indicates for patient to the ED     Care advice provided, patient verbalizes understanding; denies any other questions or concerns; instructed to call back for any new or worsening symptoms. Attention Provider: Thank you for allowing me to participate in the care of your patient. The patient was connected to triage in response to information provided to the ECC/PSC. Please do not respond through this encounter as the response is not directed to a shared pool. Reason for Disposition   MODERATE difficulty breathing (e.g., speaks in phrases, SOB even at rest, pulse 100-120) of new onset or worse than normal    Answer Assessment - Initial Assessment Questions  1. RESPIRATORY STATUS: \"Describe your breathing? \" (e.g., wheezing, shortness of breath, unable to speak, severe coughing)       Sob    2. ONSET: \"When did this breathing problem begin? \"       A while a go     3. PATTERN \"Does the difficult breathing come and go, or has it been constant since it started? \"       Constant     4. SEVERITY: \"How bad is your breathing? \" (e.g., mild, moderate, severe)     - MILD: No SOB at rest, mild SOB with walking, speaks normally in sentences, can lay down, no retractions, pulse < 100.     - MODERATE: SOB at rest, SOB with minimal exertion and prefers to sit, cannot lie down flat, speaks in phrases, mild retractions, audible wheezing, pulse 100-120.     - SEVERE: Very SOB at rest, speaks in single words, struggling to breathe, sitting hunched forward, retractions, pulse > 120       Severe     5.  RECURRENT SYMPTOM: Michal Denver

## 2021-11-22 ENCOUNTER — TELEPHONE (OUTPATIENT)
Dept: FAMILY MEDICINE CLINIC | Age: 66
End: 2021-11-22

## 2021-11-22 DIAGNOSIS — J81.0 ACUTE PULMONARY EDEMA (HCC): Primary | ICD-10-CM

## 2021-11-22 DIAGNOSIS — I50.9 ACUTE CONGESTIVE HEART FAILURE, UNSPECIFIED HEART FAILURE TYPE (HCC): ICD-10-CM

## 2021-11-22 DIAGNOSIS — J90 BILATERAL PLEURAL EFFUSION: ICD-10-CM

## 2021-11-22 RX ORDER — FUROSEMIDE 20 MG/1
20 TABLET ORAL DAILY
Qty: 7 TABLET | Refills: 0 | Status: SHIPPED | OUTPATIENT
Start: 2021-11-22 | End: 2022-04-25

## 2021-11-22 NOTE — TELEPHONE ENCOUNTER
I'm going to send in a low dose diuretic for her to take and I think that it will help her breath easier (it will also make her urinate).  Follow up with cardiology on Wednesday as scheduled

## 2021-11-22 NOTE — TELEPHONE ENCOUNTER
----- Message from Aman Laura DO sent at 11/22/2021  8:06 AM EST -----  Regarding: Call Patient  Please call Ruth and see how she is feeling? Also please ask if the hospital put her on any diuretic (Lasix aka Furosemide) and when exactly her cardiologist appointment is.     Thank you

## 2021-11-24 LAB
AO ROOT DIAM: NORMAL
AV MG: NORMAL
AV V1 MAX: NORMAL
AV V2 MAX: NORMAL
AVA DOPPLER: NORMAL
ECHO AV PEAK GRADIENT: NORMAL
ECHO AV VTI: NORMAL
ECHO LV INTERNAL DIMENSION DIASTOLIC: NORMAL
ECHO LV INTERNAL DIMENSION SYSTOLIC: NORMAL
ECHO LV POSTERIOR WALL DIASTOLIC: NORMAL
ECHO LVOT DIAM: NORMAL
ECHO MV A VELOCITY: NORMAL
ECHO MV AREA PHT: NORMAL
ECHO MV E VELOCITY: NORMAL
ECHO MV E/A RATIO: NORMAL
ECHO MV MEAN GRADIENT: NORMAL
ECHO PVEIN S/D RATIO: NORMAL
ECHO RV INTERNAL DIMENSION: NORMAL
IVC-%COLLAPSE: NORMAL
IVC: NORMAL
IVSD (M-MODE): NORMAL
IVSD: NORMAL
LAD 2D: NORMAL
LAD M-MODE: NORMAL
LEFT VENTRICULAR EJECTION FRACTION MODE: NORMAL
LV EF: NORMAL %
LV MASS (M-MODE): NORMAL
LV MASS/BSA: NORMAL
LVIDD (M-MODE): NORMAL
LVIDS (M-MODE): NORMAL
LVPWD (M-MODE): NORMAL
LVPWD 2D: NORMAL
LVSV: NORMAL
MR VELOCITY: NORMAL
MR VTI: NORMAL
MV A POINT: NORMAL
MV D: NORMAL
MV DT: NORMAL
MV EPSS: NORMAL
MV PG: NORMAL
MV PHT: NORMAL
MV VTI  V2: NORMAL
MVA: NORMAL
PA DIAST PRESS: NORMAL
PISA MR VOL: NORMAL
PISA MV REG ALIAS VEL: NORMAL
RAP: NORMAL
RVIDD M-MODE: NORMAL
RVSP ESTIMATE: NORMAL
TV RPFV: NORMAL

## 2021-12-01 DIAGNOSIS — J81.0 ACUTE PULMONARY EDEMA (HCC): ICD-10-CM

## 2021-12-01 DIAGNOSIS — R06.02 SOB (SHORTNESS OF BREATH): ICD-10-CM

## 2021-12-01 DIAGNOSIS — J90 BILATERAL PLEURAL EFFUSION: ICD-10-CM

## 2021-12-01 DIAGNOSIS — I51.7 CARDIOMEGALY: ICD-10-CM

## 2021-12-13 DIAGNOSIS — F41.1 GAD (GENERALIZED ANXIETY DISORDER): ICD-10-CM

## 2021-12-13 DIAGNOSIS — M79.7 FIBROMYALGIA: ICD-10-CM

## 2021-12-13 RX ORDER — BUSPIRONE HYDROCHLORIDE 5 MG/1
TABLET ORAL
Qty: 60 TABLET | Refills: 0 | Status: SHIPPED | OUTPATIENT
Start: 2021-12-13 | End: 2022-01-18

## 2021-12-13 RX ORDER — CITALOPRAM 40 MG/1
TABLET ORAL
Qty: 30 TABLET | Refills: 2 | Status: SHIPPED | OUTPATIENT
Start: 2021-12-13 | End: 2022-03-16

## 2021-12-13 RX ORDER — CYCLOBENZAPRINE HCL 10 MG
TABLET ORAL
Qty: 30 TABLET | Refills: 0 | Status: SHIPPED | OUTPATIENT
Start: 2021-12-13 | End: 2022-01-10

## 2022-01-10 DIAGNOSIS — M79.7 FIBROMYALGIA: ICD-10-CM

## 2022-01-10 RX ORDER — CYCLOBENZAPRINE HCL 10 MG
TABLET ORAL
Qty: 30 TABLET | Refills: 0 | Status: SHIPPED | OUTPATIENT
Start: 2022-01-10 | End: 2022-02-07

## 2022-01-10 NOTE — TELEPHONE ENCOUNTER
Elba Oneal is calling to request a refill on the following medication(s):    Medication Request:  Requested Prescriptions     Pending Prescriptions Disp Refills    cyclobenzaprine (FLEXERIL) 10 MG tablet [Pharmacy Med Name: CYCLOBENZAPRINE 10 MG TABLET] 30 tablet 0     Sig: TAKE ONE TABLET BY MOUTH THREE TIMES A DAY AS NEEDED FOR MUSCLE SPASMS       Last Visit Date (If Applicable):  13/09/0288    Next Visit Date:    Visit date not found

## 2022-01-17 DIAGNOSIS — F41.1 GAD (GENERALIZED ANXIETY DISORDER): ICD-10-CM

## 2022-01-17 NOTE — TELEPHONE ENCOUNTER
Ange Baltazar is calling to request a refill on the following medication(s):    Medication Request:  Requested Prescriptions     Pending Prescriptions Disp Refills    busPIRone (BUSPAR) 5 MG tablet [Pharmacy Med Name: busPIRone HCL 5 MG TABLET] 60 tablet 0     Sig: TAKE ONE TABLET BY MOUTH TWICE A DAY       Last Visit Date (If Applicable):  59/59/2644    Next Visit Date:    Visit date not found

## 2022-01-18 RX ORDER — BUSPIRONE HYDROCHLORIDE 5 MG/1
TABLET ORAL
Qty: 60 TABLET | Refills: 0 | Status: SHIPPED | OUTPATIENT
Start: 2022-01-18 | End: 2022-02-18

## 2022-02-07 ENCOUNTER — TELEPHONE (OUTPATIENT)
Dept: FAMILY MEDICINE CLINIC | Age: 67
End: 2022-02-07

## 2022-02-07 DIAGNOSIS — M79.7 FIBROMYALGIA: ICD-10-CM

## 2022-02-07 RX ORDER — CYCLOBENZAPRINE HCL 10 MG
TABLET ORAL
Qty: 30 TABLET | Refills: 0 | Status: SHIPPED | OUTPATIENT
Start: 2022-02-07 | End: 2022-02-18

## 2022-02-07 NOTE — TELEPHONE ENCOUNTER
----- Message from Cecilia Bolanos sent at 2/7/2022 12:18 PM EST -----  Subject: Message to Provider    QUESTIONS  Information for Provider? patient received a letter for diagnostic testing   and she does not want to get it right now. (colonoscopy and cologuard).   has too much going on right now seeing a Cardiologist.  ---------------------------------------------------------------------------  --------------  [address]  What is the best way for the office to contact you? OK to leave message on   voicemail  Preferred Call Back Phone Number? [phone]  ---------------------------------------------------------------------------  --------------  SCRIPT ANSWERS  Relationship to Patient?  Self

## 2022-02-07 NOTE — TELEPHONE ENCOUNTER
Shawn Godwin is calling to request a refill on the following medication(s):    Medication Request:  Requested Prescriptions     Pending Prescriptions Disp Refills    cyclobenzaprine (FLEXERIL) 10 MG tablet [Pharmacy Med Name: CYCLOBENZAPRINE 10 MG TABLET] 30 tablet 0     Sig: TAKE ONE TABLET BY MOUTH THREE TIMES A DAY AS NEEDED FOR MUSCLE SPASMS       Last Visit Date (If Applicable):  54/48/8851    Next Visit Date:    Visit date not found

## 2022-02-18 DIAGNOSIS — M79.7 FIBROMYALGIA: ICD-10-CM

## 2022-02-18 DIAGNOSIS — F41.1 GAD (GENERALIZED ANXIETY DISORDER): ICD-10-CM

## 2022-02-18 RX ORDER — BUSPIRONE HYDROCHLORIDE 5 MG/1
TABLET ORAL
Qty: 60 TABLET | Refills: 5 | Status: SHIPPED | OUTPATIENT
Start: 2022-02-18 | End: 2022-08-08

## 2022-02-18 RX ORDER — CYCLOBENZAPRINE HCL 10 MG
TABLET ORAL
Qty: 30 TABLET | Refills: 0 | Status: SHIPPED | OUTPATIENT
Start: 2022-02-18 | End: 2022-03-08

## 2022-02-18 NOTE — TELEPHONE ENCOUNTER
Sergio Hunt is calling to request a refill on the following medication(s):    Medication Request:  Requested Prescriptions     Pending Prescriptions Disp Refills    busPIRone (BUSPAR) 5 MG tablet [Pharmacy Med Name: busPIRone HCL 5 MG TABLET] 60 tablet 0     Sig: TAKE ONE TABLET BY MOUTH TWICE A DAY    cyclobenzaprine (FLEXERIL) 10 MG tablet [Pharmacy Med Name: CYCLOBENZAPRINE 10 MG TABLET] 30 tablet 0     Sig: TAKE ONE TABLET BY MOUTH THREE TIMES A DAY AS NEEDED FOR MUSCLE SPASMS       Last Visit Date (If Applicable):  74/44/0815    Next Visit Date:    Visit date not found

## 2022-03-08 DIAGNOSIS — M79.7 FIBROMYALGIA: ICD-10-CM

## 2022-03-08 RX ORDER — CYCLOBENZAPRINE HCL 10 MG
TABLET ORAL
Qty: 30 TABLET | Refills: 0 | Status: SHIPPED | OUTPATIENT
Start: 2022-03-08 | End: 2022-04-27

## 2022-03-08 NOTE — TELEPHONE ENCOUNTER
Grover Irwin is calling to request a refill on the following medication(s):    Medication Request:  Requested Prescriptions     Pending Prescriptions Disp Refills    cyclobenzaprine (FLEXERIL) 10 MG tablet [Pharmacy Med Name: CYCLOBENZAPRINE 10 MG TABLET] 30 tablet 0     Sig: TAKE ONE TABLET BY MOUTH THREE TIMES A DAY AS NEEDED FOR MUSCLE SPASMS       Last Visit Date (If Applicable):  10/56/5359    Next Visit Date:    3/30/2022

## 2022-03-16 DIAGNOSIS — F41.1 GAD (GENERALIZED ANXIETY DISORDER): ICD-10-CM

## 2022-03-16 RX ORDER — CITALOPRAM 40 MG/1
TABLET ORAL
Qty: 30 TABLET | Refills: 2 | Status: SHIPPED | OUTPATIENT
Start: 2022-03-16 | End: 2022-06-30

## 2022-03-16 NOTE — TELEPHONE ENCOUNTER
Elba Oneal is calling to request a refill on the following medication(s):    Medication Request:  Requested Prescriptions     Pending Prescriptions Disp Refills    citalopram (CELEXA) 40 MG tablet [Pharmacy Med Name: CITALOPRAM HBR 40 MG TABLET] 30 tablet 2     Sig: TAKE ONE TABLET BY MOUTH DAILY       Last Visit Date (If Applicable):  76/07/2502    Next Visit Date:    3/30/2022

## 2022-04-19 ENCOUNTER — TELEPHONE (OUTPATIENT)
Dept: FAMILY MEDICINE CLINIC | Age: 67
End: 2022-04-19

## 2022-04-19 NOTE — TELEPHONE ENCOUNTER
brando ordered on 11/17/21.  left a message to call the office to discuss colorectal cancer screening options

## 2022-04-23 DIAGNOSIS — M79.7 FIBROMYALGIA: ICD-10-CM

## 2022-04-25 RX ORDER — LISINOPRIL 20 MG/1
20 TABLET ORAL DAILY
COMMUNITY

## 2022-04-25 RX ORDER — METOPROLOL SUCCINATE 25 MG/1
25 TABLET, EXTENDED RELEASE ORAL DAILY
COMMUNITY

## 2022-04-25 RX ORDER — ASPIRIN 81 MG/1
81 TABLET ORAL DAILY
COMMUNITY

## 2022-04-25 RX ORDER — FUROSEMIDE 40 MG/1
40 TABLET ORAL DAILY
COMMUNITY

## 2022-04-25 RX ORDER — NICOTINE 21 MG/24HR
1 PATCH, TRANSDERMAL 24 HOURS TRANSDERMAL EVERY 24 HOURS
COMMUNITY

## 2022-04-25 NOTE — TELEPHONE ENCOUNTER
Julianne Metcalf is calling to request a refill on the following medication(s):    Medication Request:  Requested Prescriptions     Pending Prescriptions Disp Refills    cyclobenzaprine (FLEXERIL) 10 MG tablet [Pharmacy Med Name: CYCLOBENZAPRINE 10 MG TABLET] 30 tablet 0     Sig: TAKE ONE TABLET BY MOUTH THREE TIMES A DAY AS NEEDED FOR MUSCLE SPASMS       Last Visit Date (If Applicable):  36/55/0809    Next Visit Date:    Visit date not found

## 2022-04-26 ENCOUNTER — HOSPITAL ENCOUNTER (OUTPATIENT)
Dept: CARDIAC CATH/INVASIVE PROCEDURES | Age: 67
Discharge: HOME | End: 2022-04-26
Attending: STUDENT IN AN ORGANIZED HEALTH CARE EDUCATION/TRAINING PROGRAM | Admitting: STUDENT IN AN ORGANIZED HEALTH CARE EDUCATION/TRAINING PROGRAM
Payer: MEDICARE

## 2022-04-26 VITALS
TEMPERATURE: 98.2 F | DIASTOLIC BLOOD PRESSURE: 60 MMHG | WEIGHT: 195 LBS | BODY MASS INDEX: 27.3 KG/M2 | HEART RATE: 79 BPM | OXYGEN SATURATION: 99 % | HEIGHT: 71 IN | SYSTOLIC BLOOD PRESSURE: 110 MMHG | RESPIRATION RATE: 18 BRPM

## 2022-04-26 LAB
ABSOLUTE EOS #: 0.13 K/UL (ref 0–0.44)
ABSOLUTE IMMATURE GRANULOCYTE: 0.07 K/UL (ref 0–0.3)
ABSOLUTE LYMPH #: 2.07 K/UL (ref 1.1–3.7)
ABSOLUTE MONO #: 1.28 K/UL (ref 0.1–1.2)
ANION GAP SERPL CALCULATED.3IONS-SCNC: 12 MMOL/L (ref 9–17)
BASOPHILS # BLD: 1 % (ref 0–2)
BASOPHILS ABSOLUTE: 0.07 K/UL (ref 0–0.2)
BUN BLDV-MCNC: 18 MG/DL (ref 8–23)
BUN/CREAT BLD: 20 (ref 9–20)
CALCIUM SERPL-MCNC: 9.9 MG/DL (ref 8.6–10.4)
CHLORIDE BLD-SCNC: 101 MMOL/L (ref 98–107)
CO2: 27 MMOL/L (ref 20–31)
CREAT SERPL-MCNC: 0.91 MG/DL (ref 0.5–0.9)
EOSINOPHILS RELATIVE PERCENT: 1 % (ref 1–4)
GFR AFRICAN AMERICAN: >60 ML/MIN
GFR NON-AFRICAN AMERICAN: >60 ML/MIN
GFR SERPL CREATININE-BSD FRML MDRD: ABNORMAL ML/MIN/{1.73_M2}
GLUCOSE BLD-MCNC: 96 MG/DL (ref 70–99)
HCT VFR BLD CALC: 52 % (ref 36.3–47.1)
HEMOGLOBIN: 16 G/DL (ref 11.9–15.1)
IMMATURE GRANULOCYTES: 1 %
LV EF: 18 %
LVEF MODALITY: NORMAL
LYMPHOCYTES # BLD: 18 % (ref 24–43)
MCH RBC QN AUTO: 29.8 PG (ref 25.2–33.5)
MCHC RBC AUTO-ENTMCNC: 30.8 G/DL (ref 28.4–34.8)
MCV RBC AUTO: 96.8 FL (ref 82.6–102.9)
MONOCYTES # BLD: 11 % (ref 3–12)
NRBC AUTOMATED: 0 PER 100 WBC
PDW BLD-RTO: 13.9 % (ref 11.8–14.4)
PLATELET # BLD: 252 K/UL (ref 138–453)
PMV BLD AUTO: 9.5 FL (ref 8.1–13.5)
POTASSIUM SERPL-SCNC: 4.3 MMOL/L (ref 3.7–5.3)
RBC # BLD: 5.37 M/UL (ref 3.95–5.11)
SEG NEUTROPHILS: 68 % (ref 36–65)
SEGMENTED NEUTROPHILS ABSOLUTE COUNT: 7.88 K/UL (ref 1.5–8.1)
SODIUM BLD-SCNC: 140 MMOL/L (ref 135–144)
WBC # BLD: 11.5 K/UL (ref 3.5–11.3)

## 2022-04-26 PROCEDURE — C1894 INTRO/SHEATH, NON-LASER: HCPCS

## 2022-04-26 PROCEDURE — 6360000004 HC RX CONTRAST MEDICATION

## 2022-04-26 PROCEDURE — 85025 COMPLETE CBC W/AUTO DIFF WBC: CPT

## 2022-04-26 PROCEDURE — 2709999900 HC NON-CHARGEABLE SUPPLY

## 2022-04-26 PROCEDURE — 2500000003 HC RX 250 WO HCPCS

## 2022-04-26 PROCEDURE — 99152 MOD SED SAME PHYS/QHP 5/>YRS: CPT

## 2022-04-26 PROCEDURE — 80048 BASIC METABOLIC PNL TOTAL CA: CPT

## 2022-04-26 PROCEDURE — 2580000003 HC RX 258: Performed by: STUDENT IN AN ORGANIZED HEALTH CARE EDUCATION/TRAINING PROGRAM

## 2022-04-26 PROCEDURE — 6360000002 HC RX W HCPCS

## 2022-04-26 PROCEDURE — 93458 L HRT ARTERY/VENTRICLE ANGIO: CPT

## 2022-04-26 RX ORDER — SODIUM CHLORIDE 0.9 % (FLUSH) 0.9 %
5-40 SYRINGE (ML) INJECTION EVERY 12 HOURS SCHEDULED
Status: DISCONTINUED | OUTPATIENT
Start: 2022-04-26 | End: 2022-04-26 | Stop reason: HOSPADM

## 2022-04-26 RX ORDER — SODIUM CHLORIDE 9 MG/ML
INJECTION, SOLUTION INTRAVENOUS CONTINUOUS
Status: DISCONTINUED | OUTPATIENT
Start: 2022-04-26 | End: 2022-04-26 | Stop reason: HOSPADM

## 2022-04-26 RX ORDER — SODIUM CHLORIDE 0.9 % (FLUSH) 0.9 %
5-40 SYRINGE (ML) INJECTION PRN
Status: DISCONTINUED | OUTPATIENT
Start: 2022-04-26 | End: 2022-04-26 | Stop reason: HOSPADM

## 2022-04-26 RX ORDER — SODIUM CHLORIDE 9 MG/ML
INJECTION, SOLUTION INTRAVENOUS PRN
Status: ACTIVE | OUTPATIENT
Start: 2022-04-26 | End: 2022-04-26

## 2022-04-26 RX ORDER — ASPIRIN 81 MG/1
81 TABLET, CHEWABLE ORAL ONCE
Status: DISCONTINUED | OUTPATIENT
Start: 2022-04-26 | End: 2022-04-26 | Stop reason: HOSPADM

## 2022-04-26 RX ORDER — ACETAMINOPHEN 325 MG/1
650 TABLET ORAL EVERY 4 HOURS PRN
Status: DISCONTINUED | OUTPATIENT
Start: 2022-04-26 | End: 2022-04-26 | Stop reason: HOSPADM

## 2022-04-26 RX ADMIN — SODIUM CHLORIDE: 9 INJECTION, SOLUTION INTRAVENOUS at 09:18

## 2022-04-26 RX ADMIN — SODIUM CHLORIDE: 9 INJECTION, SOLUTION INTRAVENOUS at 11:06

## 2022-04-26 ASSESSMENT — PAIN - FUNCTIONAL ASSESSMENT: PAIN_FUNCTIONAL_ASSESSMENT: 0-10

## 2022-04-26 NOTE — PRE SEDATION
Sedation Pre-Procedure Note    Patient Name: Cem Nixon   YOB: 1955  Room/Bed: [facility]  Medical Record Number: [id number]  Date: 4/26/2022   Time: 9:43 AM       Indication:  Cardiomyopathy    Consent: I have discussed with the patient and/or the patient representative the indication, alternatives, and the possible risks and/or complications of the planned procedure and the anesthesia methods. The patient and/or patient representative appear to understand and agree to proceed. Vital Signs:   Vitals:    04/26/22 0859   BP: 122/77   Pulse: 92   Resp: 22   Temp: 96 °F (35.6 °C)   SpO2: 97%       Past Medical History:   has a past medical history of CHF (congestive heart failure) ([address] Fibromyalgia, PATRICIO (generalized anxiety disorder), Hyperkalemia, Hypertension, and Peripheral artery disease ([address] Past Surgical History:   has a past surgical history that includes Tubal ligation; fracture surgery (Left); and Ectopic pregnancy surgery. Medications:   Scheduled Meds:    aspirin  81 mg Oral Once     Continuous Infusions:    sodium chloride 50 mL/hr at 04/26/22 0918     PRN Meds:   Home Meds:   Prior to Admission medications    Medication Sig Start Date End Date Taking?  Authorizing Provider   aspirin 81 MG EC tablet Take 81 mg by mouth daily   Yes Historical Provider, MD   furosemide (LASIX) 40 MG tablet Take 40 mg by mouth daily   Yes Historical Provider, MD   lisinopril (PRINIVIL;ZESTRIL) 20 MG tablet Take 20 mg by mouth daily   Yes Historical Provider, MD   metoprolol succinate (TOPROL XL) 25 MG extended release tablet Take 25 mg by mouth daily   Yes Historical Provider, MD   nicotine (NICODERM CQ) 14 MG/24HR Place 1 patch onto the skin every 24 hours  Patient not taking: Reported on 4/26/2022    Historical Provider, MD   citalopram (CELEXA) 40 MG tablet TAKE ONE TABLET BY MOUTH DAILY  Patient taking differently: Take 20 mg by mouth daily  3/16/22   Matilde Brown,  cyclobenzaprine (FLEXERIL) 10 MG tablet TAKE ONE TABLET BY MOUTH THREE TIMES A DAY AS NEEDED FOR MUSCLE SPASMS  Patient taking differently: Take 10 mg by mouth See Admin Instructions At bedtime as needed. 3/8/22   Darrin Epley, DO   busPIRone (BUSPAR) 5 MG tablet TAKE ONE TABLET BY MOUTH TWICE A DAY 2/18/22   Darrin Epley, DO     Coumadin Use Last 7 Days:  no  Antiplatelet drug therapy use last 7 days: yes - ASA  Other anticoagulant use last 7 days: no  Additional Medication Information:  Per H&P      Pre-Sedation Documentation and Exam:   I have personally completed a history, physical exam & review of systems for this patient (see notes).     Mallampati Airway Assessment:  Mallampati Class II - (soft palate, fauces & uvula are visible)    Prior History of Anesthesia Complications:   none    ASA Classification:  Class 2 - A normal healthy patient with mild systemic disease    Sedation/ Anesthesia Plan:   intravenous sedation    Medications Planned:   midazolam (Versed) intravenously and fentanyl intravenously    Patient is an appropriate candidate for plan of sedation: yes    Electronically signed by María Elena Hernandez MD on 4/26/2022 at 9:43 AM

## 2022-04-26 NOTE — H&P
History and Physical Service   [facility] [place] - INPATIENT    HISTORY AND PHYSICAL EXAMINATION            Date of Evaluation: 4/26/2022  Patient name:  Marko Miranda  MRN:   [id number]  YOB: 1955  PCP:    Echo Albert DO    History Obtained From:     Patient, medical records    History of Present Illness: This is Marko Miranda a 74 y.o. female who presents today for a cardiac catheterization by Dr. Alfonso Antony for cardiomyopathy. Patient has a history of chronic systolic heart failure, hypertension, and peripheral arterial disease. Patient states it started in the fall with shortness of breath and her primary care ordered chest x-ray. She was referred to cardiology due to congestive heart fialure. Patient has been followed by cardiology for reduced EF but was previously unable to complete testing due to insurance issues. Patient was re-evaluated by Dr. Alfonso Antony on 3/14/2022 for complaints of shortness of breath with exertion, orthopnea, palpitations, and lower extremity pain with skin changes. Patient's most recent echo showed an EF of 15% which is new for her. Dr. Alfonso Antony recommended cardiac catheterization which she presents for today. Patient additionally had testing ordered for arterial doppler of lower extremities due to \"cold extremities and early ulcers and pallor\" per Dr. Patricia Ramirez note in short chart. Patient was evaluated by vascular on 3/25/2022. Denies fever, chills, cough, congestion, wheezing, chest pain, open sores or wounds. Denies hx diabetes. Last ASA 81mg 4/26/2022.      Past Medical History:     Past Medical History:   Diagnosis Date    CHF (congestive heart failure) ([facility] [address] 03/14/2022    Fibromyalgia     PATRICIO (generalized anxiety disorder) 6/16/2021    Hyperkalemia     Hypertension     Peripheral artery disease (HCC)         Past Surgical History:     Past Surgical History:   Procedure Laterality Date    ECTOPIC PREGNANCY SURGERY      FRACTURE SURGERY Left     ankle  TUBAL LIGATION          Medications Prior to Admission:     Prior to Admission medications    Medication Sig Start Date End Date Taking? Authorizing Provider   aspirin 81 MG EC tablet Take 81 mg by mouth daily   Yes Historical Provider, MD   furosemide (LASIX) 40 MG tablet Take 40 mg by mouth daily   Yes Historical Provider, MD   lisinopril (PRINIVIL;ZESTRIL) 20 MG tablet Take 20 mg by mouth daily   Yes Historical Provider, MD   metoprolol succinate (TOPROL XL) 25 MG extended release tablet Take 25 mg by mouth daily   Yes Historical Provider, MD   nicotine (NICODERM CQ) 14 MG/24HR Place 1 patch onto the skin every 24 hours  Patient not taking: Reported on 4/26/2022    Historical Provider, MD   citalopram (CELEXA) 40 MG tablet TAKE ONE TABLET BY MOUTH DAILY  Patient taking differently: Take 20 mg by mouth daily  3/16/22   Lupis Noel DO   cyclobenzaprine (FLEXERIL) 10 MG tablet TAKE ONE TABLET BY MOUTH THREE TIMES A DAY AS NEEDED FOR MUSCLE SPASMS  Patient taking differently: Take 10 mg by mouth See Admin Instructions At bedtime as needed. 3/8/22   Lupis Noel DO   busPIRone (BUSPAR) 5 MG tablet TAKE ONE TABLET BY MOUTH TWICE A DAY 2/18/22   Lupis Noel DO        Allergies:     Patient has no known allergies. Social History:     Tobacco:    reports that she has been smoking cigarettes. She has a 40.00 pack-year smoking history. She has never used smokeless tobacco.  Alcohol:      reports no history of alcohol use. Drug Use:  reports no history of drug use. Family History:     Family History   Problem Relation Age of Onset    Cancer Mother     Heart Disease Mother     Stroke Father        Review of Systems:     Positive and Negative as described in HPI. CONSTITUTIONAL: Sweating. Fatigue. negative for fevers, chills, weight loss  HEENT: Wears glasses.  negative for vision, hearing changes, runny nose, throat pain  RESPIRATORY: Shortness of breath with exertion and orthopnea. negative for  cough, congestion, wheezing. CARDIOVASCULAR: See HPI   GASTROINTESTINAL: negative for nausea, vomiting, diarrhea, constipation, change in bowel habits, abdominal pain   GENITOURINARY: negative for difficulty of urination, burning with urination, frequency   INTEGUMENT: Easy bruising negative for rash, skin lesions  HEMATOLOGIC/LYMPHATIC: Bilateral feet swelling   ALLERGIC/IMMUNOLOGIC: negative for urticaria , itching  ENDOCRINE: negative increase in drinking, increase in urination, hot or cold intolerance  MUSCULOSKELETAL: Fibromyalgia. Generalized joint pains. negative muscle aches, swelling of joints  NEUROLOGICAL: negative for headaches, dizziness, lightheadedness, numbness, pain, tingling extremities  BEHAVIOR/PSYCH: Anxiety negative for depression    Physical Exam:   /77   Pulse 92   Temp 96 °F (35.6 °C) (Temporal)   Resp 22   Ht 5' 11\" (1.803 m)   Wt 195 lb (88.5 kg)   SpO2 97%   BMI 27.20 kg/m²   No LMP recorded. Patient is postmenopausal.  No obstetric history on file. No results for input(s): POCGLU in the last 72 hours. General Appearance:  alert, well appearing, and in no acute distress  Mental status: oriented to person, place, and time with normal affect  Head:  normocephalic, atraumatic. Eye: wearing glasses. no icterus, redness, pupils equal and reactive, extraocular eye movements intact, conjunctiva clear  Ear: normal external ear, no discharge, hearing intact  Nose:  no drainage noted  Mouth: mucous membranes moist  Neck: supple, no carotid bruits, thyroid not palpable  Lungs: Bilateral equal air entry, fine crackles in bilateral bases to ausculation, no wheezing, rales or rhonchi, normal effort  Cardiovascular: HR 92 normal rate, regular rhythm, no murmur, gallop, rub.   Abdomen: Soft, nontender, nondistended, normal bowel sounds  Neurologic: There are no new focal motor or sensory deficits, normal muscle tone and bulk, no abnormal sensation, normal speech, cranial nerves II through XII grossly intact  Skin: No gross lesions, rashes, bruising or bleeding on exposed skin area  Extremities: Pale mottled appearance to bilateral lower extremities. peripheral pulses palpable, no pedal edema or calf pain with palpation  Psych: anxious     Investigations:      Laboratory Testing:  No results found for this or any previous visit (from the past 24 hour(s)). No results for input(s): HGB, HCT, WBC, MCV, PLATELET, NA, K, CL, CO2, BUN, CREATININE, GLUCOSE, INR, PROTIME, APTT, AST, ALT, LABALBU, HCG in the last 720 hours. No results for input(s): COVID19 in the last 720 hours. Imaging/Diagnostics:    No results found. Diagnosis:      1. Cardiomyopathy    Plans:     1.  Cardiac catheterization      JOCELYN Reyes CNP  4/26/2022  9:21 AM

## 2022-04-27 RX ORDER — CYCLOBENZAPRINE HCL 10 MG
10 TABLET ORAL SEE ADMIN INSTRUCTIONS
Qty: 90 TABLET | Refills: 0 | Status: SHIPPED | OUTPATIENT
Start: 2022-04-27 | End: 2022-09-29

## 2022-06-30 DIAGNOSIS — F41.1 GAD (GENERALIZED ANXIETY DISORDER): ICD-10-CM

## 2022-06-30 RX ORDER — CITALOPRAM 40 MG/1
20 TABLET ORAL DAILY
Qty: 90 TABLET | Refills: 1 | Status: SHIPPED | OUTPATIENT
Start: 2022-06-30 | End: 2022-10-12 | Stop reason: ALTCHOICE

## 2022-06-30 NOTE — TELEPHONE ENCOUNTER
Katharina La is calling to request a refill on the following medication(s):    Last Visit Date (If Applicable):  06/34/7612    Next Visit Date:    Visit date not found    Medication Request:  Requested Prescriptions     Pending Prescriptions Disp Refills    citalopram (CELEXA) 40 MG tablet [Pharmacy Med Name: CITALOPRAM HBR 40 MG TABLET] 30 tablet 2     Sig: TAKE ONE TABLET BY MOUTH DAILY

## 2022-08-07 DIAGNOSIS — F41.1 GAD (GENERALIZED ANXIETY DISORDER): ICD-10-CM

## 2022-08-08 RX ORDER — BUSPIRONE HYDROCHLORIDE 5 MG/1
TABLET ORAL
Qty: 180 TABLET | Refills: 1 | Status: SHIPPED | OUTPATIENT
Start: 2022-08-08

## 2022-08-08 NOTE — TELEPHONE ENCOUNTER
Chemo Guajardo is calling to request a refill on the following medication(s):    Medication Request:  Requested Prescriptions     Pending Prescriptions Disp Refills    busPIRone (BUSPAR) 5 MG tablet [Pharmacy Med Name: busPIRone HCL 5 MG TABLET] 180 tablet      Sig: TAKE ONE TABLET BY MOUTH TWICE A DAY       Last Visit Date (If Applicable):  01/76/5762    Next Visit Date:    Visit date not found

## 2022-09-28 DIAGNOSIS — M79.7 FIBROMYALGIA: ICD-10-CM

## 2022-09-29 RX ORDER — CYCLOBENZAPRINE HCL 10 MG
TABLET ORAL
Qty: 90 TABLET | Refills: 0 | Status: SHIPPED | OUTPATIENT
Start: 2022-09-29

## 2022-10-12 ENCOUNTER — OFFICE VISIT (OUTPATIENT)
Dept: FAMILY MEDICINE CLINIC | Age: 67
End: 2022-10-12
Payer: MEDICARE

## 2022-10-12 VITALS
TEMPERATURE: 97 F | DIASTOLIC BLOOD PRESSURE: 74 MMHG | HEART RATE: 81 BPM | SYSTOLIC BLOOD PRESSURE: 130 MMHG | OXYGEN SATURATION: 95 % | WEIGHT: 208 LBS | BODY MASS INDEX: 29.01 KG/M2

## 2022-10-12 DIAGNOSIS — Z71.6 ENCOUNTER FOR SMOKING CESSATION COUNSELING: Primary | ICD-10-CM

## 2022-10-12 PROCEDURE — G8427 DOCREV CUR MEDS BY ELIG CLIN: HCPCS

## 2022-10-12 PROCEDURE — G8400 PT W/DXA NO RESULTS DOC: HCPCS

## 2022-10-12 PROCEDURE — G8417 CALC BMI ABV UP PARAM F/U: HCPCS

## 2022-10-12 PROCEDURE — 3017F COLORECTAL CA SCREEN DOC REV: CPT

## 2022-10-12 PROCEDURE — G8484 FLU IMMUNIZE NO ADMIN: HCPCS

## 2022-10-12 PROCEDURE — 99213 OFFICE O/P EST LOW 20 MIN: CPT

## 2022-10-12 PROCEDURE — 1123F ACP DISCUSS/DSCN MKR DOCD: CPT

## 2022-10-12 PROCEDURE — 1090F PRES/ABSN URINE INCON ASSESS: CPT

## 2022-10-12 PROCEDURE — 4004F PT TOBACCO SCREEN RCVD TLK: CPT

## 2022-10-12 RX ORDER — BUPROPION HYDROCHLORIDE 150 MG/1
150 TABLET, EXTENDED RELEASE ORAL 2 TIMES DAILY
Qty: 180 TABLET | Refills: 1 | Status: SHIPPED | OUTPATIENT
Start: 2022-10-12

## 2022-10-12 ASSESSMENT — ENCOUNTER SYMPTOMS
SHORTNESS OF BREATH: 1
COLOR CHANGE: 0
NAUSEA: 0
DIARRHEA: 0
CHEST TIGHTNESS: 0
VOMITING: 0
COUGH: 0
SORE THROAT: 0
CONSTIPATION: 0
EYE DISCHARGE: 0
WHEEZING: 0
ABDOMINAL PAIN: 0

## 2022-10-12 ASSESSMENT — PATIENT HEALTH QUESTIONNAIRE - PHQ9
2. FEELING DOWN, DEPRESSED OR HOPELESS: 0
SUM OF ALL RESPONSES TO PHQ QUESTIONS 1-9: 0
1. LITTLE INTEREST OR PLEASURE IN DOING THINGS: 0
SUM OF ALL RESPONSES TO PHQ9 QUESTIONS 1 & 2: 0

## 2022-10-12 NOTE — PROGRESS NOTES
Shaka Schwartz (:  1955) is a 74 y.o. female,Established patient, here for evaluation of the following chief complaint(s): Other (Wants medication to stop smoking and something for anxiousness )          Subjective   SUBJECTIVE/OBJECTIVE:  Pt here today for smoking cessation assistance  Pt has been a 50 year 1 PPD smoker. She quit drinking last year and now that she has been dealing with a newer dx of CHF she wants to quit smoking to improve her health. Pt has tried nicotine patches and gum with no success. She is interested in medication management of this. We discussed options between Chantix and Zyban, she would like to try Zyban. Review of Systems   Constitutional:  Negative for activity change, appetite change, chills, fatigue, fever and unexpected weight change. HENT:  Negative for congestion, ear pain and sore throat. Eyes:  Negative for discharge and visual disturbance. Respiratory:  Positive for shortness of breath (w/ exertion). Negative for cough, chest tightness and wheezing. Cardiovascular:  Negative for chest pain, palpitations and leg swelling. Gastrointestinal:  Negative for abdominal pain, constipation, diarrhea, nausea and vomiting. Genitourinary:  Negative for dysuria and pelvic pain. Musculoskeletal:  Negative for gait problem and neck pain. Skin:  Negative for color change, rash and wound. Neurological:  Negative for dizziness, seizures, syncope, weakness, light-headedness, numbness and headaches. Psychiatric/Behavioral:  Negative for behavioral problems, confusion, self-injury and suicidal ideas. The patient is not nervous/anxious. Objective   Physical Exam  Vitals and nursing note reviewed. Constitutional:       General: She is not in acute distress. Appearance: Normal appearance. She is well-developed. She is not ill-appearing, toxic-appearing or diaphoretic. HENT:      Head: Normocephalic and atraumatic.       Right Ear: External ear normal.      Left Ear: External ear normal.      Nose: Nose normal.   Eyes:      General: No scleral icterus. Right eye: No discharge. Left eye: No discharge. Conjunctiva/sclera: Conjunctivae normal.      Pupils: Pupils are equal, round, and reactive to light. Neck:      Trachea: No tracheal deviation. Cardiovascular:      Rate and Rhythm: Normal rate and regular rhythm. Heart sounds: Normal heart sounds. No murmur heard. No friction rub. No gallop. Pulmonary:      Effort: Pulmonary effort is normal. No tachypnea, accessory muscle usage or respiratory distress. Breath sounds: Normal breath sounds. No stridor. No decreased breath sounds, wheezing, rhonchi or rales. Abdominal:      Palpations: Abdomen is soft. Musculoskeletal:         General: No tenderness or deformity. Normal range of motion. Cervical back: Normal range of motion and neck supple. Skin:     General: Skin is warm and dry. Coloration: Skin is not pale. Findings: No erythema or rash. Neurological:      Mental Status: She is alert and oriented to person, place, and time. GCS: GCS eye subscore is 4. GCS verbal subscore is 5. GCS motor subscore is 6. Gait: Gait is intact. Gait normal.   Psychiatric:         Speech: Speech normal.         Behavior: Behavior normal.         Thought Content: Thought content normal.         Judgment: Judgment normal.               ASSESSMENT/PLAN:  1. Encounter for smoking cessation counseling  -trial zyban   -Celexa d/c'd during zyban use, discussed pt may feel mood well controlled on this as well and can be continued  -encouraged complete cessation in 2 weeks, pt to attempt, if unable we discussed reduction of smoking by 0.25 PPD weekly. -     buPROPion (WELLBUTRIN SR) 150 MG extended release tablet;  Take 1 tablet by mouth 2 times daily Take 1 pill daily for 3 days then 1 pill twice daily, Disp-180 tablet, R-1Normal    Return in about 1 month (around

## 2022-11-03 ENCOUNTER — TELEPHONE (OUTPATIENT)
Dept: FAMILY MEDICINE CLINIC | Age: 67
End: 2022-11-03

## 2022-11-03 DIAGNOSIS — Z71.6 ENCOUNTER FOR SMOKING CESSATION COUNSELING: ICD-10-CM

## 2022-11-03 DIAGNOSIS — F41.1 GAD (GENERALIZED ANXIETY DISORDER): Primary | ICD-10-CM

## 2022-11-03 RX ORDER — BUPROPION HYDROCHLORIDE 450 MG/1
450 TABLET, FILM COATED, EXTENDED RELEASE ORAL EVERY MORNING
Qty: 90 TABLET | Refills: 1 | Status: SHIPPED | OUTPATIENT
Start: 2022-11-03 | End: 2022-11-09

## 2022-11-03 NOTE — TELEPHONE ENCOUNTER
Patient calling in to ask if she can get some type of medication that will help calm her nerves, stated that's what the cigarettes do but shes on Wellbutrin to help her stop smoking

## 2022-11-08 ENCOUNTER — TELEPHONE (OUTPATIENT)
Dept: FAMILY MEDICINE CLINIC | Age: 67
End: 2022-11-08

## 2022-11-08 DIAGNOSIS — F41.1 GAD (GENERALIZED ANXIETY DISORDER): ICD-10-CM

## 2022-11-08 DIAGNOSIS — Z71.6 ENCOUNTER FOR SMOKING CESSATION COUNSELING: ICD-10-CM

## 2022-11-08 RX ORDER — BUPROPION HYDROCHLORIDE 150 MG/1
450 TABLET ORAL EVERY MORNING
Qty: 270 TABLET | Refills: 0 | Status: SHIPPED | OUTPATIENT
Start: 2022-11-08 | End: 2022-11-09

## 2022-11-08 NOTE — TELEPHONE ENCOUNTER
Patient called in stating the medication that was sent over for her nerves is too expensive, she says it was going to cost her over $1,000 and she is not able to afford that she is asking if something else can be sent in.  Please advise

## 2022-11-08 NOTE — TELEPHONE ENCOUNTER
I tried sending it as Wellbutrin 150mg taking 3 tablets daily as opposed to the 450mg once daily.  Lets see if the insurance covers this formulation before changing anything

## 2022-11-09 RX ORDER — PAROXETINE 10 MG/1
10 TABLET, FILM COATED ORAL DAILY
Qty: 90 TABLET | Refills: 1 | Status: SHIPPED | OUTPATIENT
Start: 2022-11-09

## 2022-11-09 NOTE — TELEPHONE ENCOUNTER
Patient returned phone call and she doesn't want to mess with the Wellbutrin she wants a different medication and she is having high anxiety.     Please advise    Thank you,  Homar Estrella

## 2022-11-14 ENCOUNTER — OFFICE VISIT (OUTPATIENT)
Dept: FAMILY MEDICINE CLINIC | Age: 67
End: 2022-11-14
Payer: MEDICARE

## 2022-11-14 VITALS
WEIGHT: 212 LBS | OXYGEN SATURATION: 93 % | DIASTOLIC BLOOD PRESSURE: 72 MMHG | HEART RATE: 84 BPM | SYSTOLIC BLOOD PRESSURE: 122 MMHG | TEMPERATURE: 97.1 F | BODY MASS INDEX: 29.57 KG/M2

## 2022-11-14 DIAGNOSIS — F41.1 GAD (GENERALIZED ANXIETY DISORDER): Primary | ICD-10-CM

## 2022-11-14 DIAGNOSIS — Z72.0 SMOKING TRYING TO QUIT: ICD-10-CM

## 2022-11-14 PROCEDURE — 4004F PT TOBACCO SCREEN RCVD TLK: CPT

## 2022-11-14 PROCEDURE — 3078F DIAST BP <80 MM HG: CPT

## 2022-11-14 PROCEDURE — 99213 OFFICE O/P EST LOW 20 MIN: CPT

## 2022-11-14 PROCEDURE — 1090F PRES/ABSN URINE INCON ASSESS: CPT

## 2022-11-14 PROCEDURE — 3074F SYST BP LT 130 MM HG: CPT

## 2022-11-14 PROCEDURE — 3017F COLORECTAL CA SCREEN DOC REV: CPT

## 2022-11-14 PROCEDURE — G8484 FLU IMMUNIZE NO ADMIN: HCPCS

## 2022-11-14 PROCEDURE — G8417 CALC BMI ABV UP PARAM F/U: HCPCS

## 2022-11-14 PROCEDURE — G8400 PT W/DXA NO RESULTS DOC: HCPCS

## 2022-11-14 PROCEDURE — 1123F ACP DISCUSS/DSCN MKR DOCD: CPT

## 2022-11-14 PROCEDURE — G8427 DOCREV CUR MEDS BY ELIG CLIN: HCPCS

## 2022-11-14 RX ORDER — AMLODIPINE BESYLATE 5 MG/1
TABLET ORAL
COMMUNITY
Start: 2022-09-29

## 2022-11-14 RX ORDER — ALBUTEROL SULFATE 90 UG/1
AEROSOL, METERED RESPIRATORY (INHALATION)
COMMUNITY
Start: 2022-10-23

## 2022-11-14 ASSESSMENT — PATIENT HEALTH QUESTIONNAIRE - PHQ9
SUM OF ALL RESPONSES TO PHQ9 QUESTIONS 1 & 2: 0
SUM OF ALL RESPONSES TO PHQ QUESTIONS 1-9: 0
SUM OF ALL RESPONSES TO PHQ QUESTIONS 1-9: 0
1. LITTLE INTEREST OR PLEASURE IN DOING THINGS: 0
2. FEELING DOWN, DEPRESSED OR HOPELESS: 0
SUM OF ALL RESPONSES TO PHQ QUESTIONS 1-9: 0
SUM OF ALL RESPONSES TO PHQ QUESTIONS 1-9: 0

## 2022-11-14 ASSESSMENT — ENCOUNTER SYMPTOMS
EYE DISCHARGE: 0
WHEEZING: 0
SHORTNESS OF BREATH: 0
ABDOMINAL PAIN: 0
NAUSEA: 0
COLOR CHANGE: 0
SORE THROAT: 0
DIARRHEA: 0
COUGH: 0
CONSTIPATION: 0
VOMITING: 0
CHEST TIGHTNESS: 0

## 2022-11-14 NOTE — PROGRESS NOTES
Ahmet Fierro (:  1955) is a 74 y.o. female,Established patient, here for evaluation of the following chief complaint(s):  Follow-up          Subjective   SUBJECTIVE/OBJECTIVE:  Pt here today to f/u on PATRICIO sx and smoking cessation  VSS    Pt initially wanted to trial wellbutrin for smoking cessation; preferred to start low and increase if needed. Her celexa was stopped at that time. She was having increased anxiety and called in, Dr Cary Santana increased her Wellbutrin to 450 daily and added Paxil. Since she has felt significant improvement in her anxiety and she has not smoked in 3 weeks--congratulated! Review of Systems   Constitutional:  Negative for activity change, appetite change, chills, fatigue, fever and unexpected weight change. HENT:  Positive for congestion and postnasal drip. Negative for ear pain and sore throat. Eyes:  Negative for discharge and visual disturbance. Respiratory:  Negative for cough, chest tightness, shortness of breath and wheezing. Cardiovascular:  Negative for chest pain, palpitations and leg swelling. Gastrointestinal:  Negative for abdominal pain, constipation, diarrhea, nausea and vomiting. Genitourinary:  Negative for dysuria and pelvic pain. Musculoskeletal:  Negative for gait problem and neck pain. Skin:  Negative for color change, rash and wound. Neurological:  Negative for dizziness, tremors, seizures, speech difficulty, weakness, light-headedness, numbness and headaches. Psychiatric/Behavioral:  Negative for behavioral problems, confusion, self-injury, sleep disturbance and suicidal ideas. The patient is not nervous/anxious. Objective   Physical Exam  Vitals and nursing note reviewed. Constitutional:       General: She is not in acute distress. Appearance: Normal appearance. She is well-developed. She is not ill-appearing, toxic-appearing or diaphoretic. HENT:      Head: Normocephalic and atraumatic.       Right Ear: External ear normal.      Left Ear: External ear normal.      Nose: Nose normal.   Eyes:      General: No scleral icterus. Right eye: No discharge. Left eye: No discharge. Conjunctiva/sclera: Conjunctivae normal.      Pupils: Pupils are equal, round, and reactive to light. Neck:      Vascular: No carotid bruit. Trachea: No tracheal deviation. Cardiovascular:      Rate and Rhythm: Normal rate and regular rhythm. Heart sounds: Normal heart sounds. No murmur heard. No friction rub. No gallop. Pulmonary:      Effort: Pulmonary effort is normal. No tachypnea, accessory muscle usage or respiratory distress. Breath sounds: Normal breath sounds. No stridor. No decreased breath sounds, wheezing, rhonchi or rales. Abdominal:      Palpations: Abdomen is soft. Musculoskeletal:         General: No tenderness or deformity. Normal range of motion. Cervical back: Normal range of motion and neck supple. Skin:     General: Skin is warm and dry. Coloration: Skin is not pale. Findings: No erythema or rash. Neurological:      Mental Status: She is alert and oriented to person, place, and time. GCS: GCS eye subscore is 4. GCS verbal subscore is 5. GCS motor subscore is 6. Gait: Gait is intact. Gait normal.   Psychiatric:         Speech: Speech normal.         Behavior: Behavior normal.         Thought Content: Thought content normal.         Judgment: Judgment normal.               ASSESSMENT/PLAN:  1. PATRICIO (generalized anxiety disorder)  2. Smoking trying to quit  -general anxiety sx significantly improved  -tolerating meds well, has not smoked in 3 weeks      Return in about 3 months (around 2/14/2023), or if symptoms worsen or fail to improve, for smoking cessation/anxiety, f/u w/ Lashaun Tejeda PCP. An electronic signature was used to authenticate this note.     --Geno Lange, APRN - CNP

## 2023-02-02 DIAGNOSIS — Z71.6 ENCOUNTER FOR SMOKING CESSATION COUNSELING: ICD-10-CM

## 2023-02-02 DIAGNOSIS — F41.1 GAD (GENERALIZED ANXIETY DISORDER): ICD-10-CM

## 2023-02-03 RX ORDER — BUPROPION HYDROCHLORIDE 150 MG/1
TABLET ORAL
Qty: 270 TABLET | Refills: 0 | Status: SHIPPED | OUTPATIENT
Start: 2023-02-03

## 2023-02-03 NOTE — TELEPHONE ENCOUNTER
Keely Powell is calling to request a refill on the following medication(s):    Medication Request:  Requested Prescriptions     Pending Prescriptions Disp Refills    buPROPion (WELLBUTRIN XL) 150 MG extended release tablet [Pharmacy Med Name: buPROPion HCL  MG TABLET] 270 tablet 0     Sig: TAKE THREE TABLETS BY MOUTH EVERY MORNING       Last Visit Date (If Applicable):  36/23/5444    Next Visit Date:    2/14/2023

## 2023-02-07 ENCOUNTER — TELEPHONE (OUTPATIENT)
Dept: FAMILY MEDICINE CLINIC | Age: 68
End: 2023-02-07

## 2023-02-07 NOTE — TELEPHONE ENCOUNTER
Patient was taking Wellbutrin  mg 3 tablets every AM    Patient wasn't aware you were changing it to   Wellbutrin  mg. BID    Pharmacy wants you to clarify please.     Dionne Quinn  [phone]    Baron Alicia

## 2023-02-08 NOTE — TELEPHONE ENCOUNTER
Not sure what is happening here.  On 2/2/23 the wellbutrin ER 150mg three tabs in the morning is what was sent on:    buPROPion (WELLBUTRIN XL) 150 MG extended release tablet [Pharmacy Med Name: buPROPion HCL  MG TABLET] 270 tablet 0        Sig: TAKE THREE TABLETS BY MOUTH EVERY MORNING

## 2023-02-14 ENCOUNTER — OFFICE VISIT (OUTPATIENT)
Dept: FAMILY MEDICINE CLINIC | Age: 68
End: 2023-02-14
Payer: MEDICARE

## 2023-02-14 VITALS — BODY MASS INDEX: 30.13 KG/M2 | SYSTOLIC BLOOD PRESSURE: 130 MMHG | WEIGHT: 216 LBS | DIASTOLIC BLOOD PRESSURE: 80 MMHG

## 2023-02-14 DIAGNOSIS — I10 ESSENTIAL HYPERTENSION: Primary | ICD-10-CM

## 2023-02-14 DIAGNOSIS — E78.5 DYSLIPIDEMIA: ICD-10-CM

## 2023-02-14 DIAGNOSIS — R73.01 IFG (IMPAIRED FASTING GLUCOSE): ICD-10-CM

## 2023-02-14 DIAGNOSIS — M25.551 RIGHT HIP PAIN: ICD-10-CM

## 2023-02-14 DIAGNOSIS — M16.11 PRIMARY OSTEOARTHRITIS OF RIGHT HIP: ICD-10-CM

## 2023-02-14 DIAGNOSIS — I50.22 CHRONIC SYSTOLIC (CONGESTIVE) HEART FAILURE (HCC): ICD-10-CM

## 2023-02-14 DIAGNOSIS — Z87.891 FORMER SMOKER: ICD-10-CM

## 2023-02-14 DIAGNOSIS — F41.1 GAD (GENERALIZED ANXIETY DISORDER): ICD-10-CM

## 2023-02-14 PROCEDURE — 1090F PRES/ABSN URINE INCON ASSESS: CPT | Performed by: FAMILY MEDICINE

## 2023-02-14 PROCEDURE — G8484 FLU IMMUNIZE NO ADMIN: HCPCS | Performed by: FAMILY MEDICINE

## 2023-02-14 PROCEDURE — 4004F PT TOBACCO SCREEN RCVD TLK: CPT | Performed by: FAMILY MEDICINE

## 2023-02-14 PROCEDURE — 3017F COLORECTAL CA SCREEN DOC REV: CPT | Performed by: FAMILY MEDICINE

## 2023-02-14 PROCEDURE — 3078F DIAST BP <80 MM HG: CPT | Performed by: FAMILY MEDICINE

## 2023-02-14 PROCEDURE — G8427 DOCREV CUR MEDS BY ELIG CLIN: HCPCS | Performed by: FAMILY MEDICINE

## 2023-02-14 PROCEDURE — G8417 CALC BMI ABV UP PARAM F/U: HCPCS | Performed by: FAMILY MEDICINE

## 2023-02-14 PROCEDURE — 96372 THER/PROPH/DIAG INJ SC/IM: CPT | Performed by: FAMILY MEDICINE

## 2023-02-14 PROCEDURE — 1123F ACP DISCUSS/DSCN MKR DOCD: CPT | Performed by: FAMILY MEDICINE

## 2023-02-14 PROCEDURE — 99214 OFFICE O/P EST MOD 30 MIN: CPT | Performed by: FAMILY MEDICINE

## 2023-02-14 PROCEDURE — G8400 PT W/DXA NO RESULTS DOC: HCPCS | Performed by: FAMILY MEDICINE

## 2023-02-14 PROCEDURE — 3074F SYST BP LT 130 MM HG: CPT | Performed by: FAMILY MEDICINE

## 2023-02-14 RX ORDER — ATORVASTATIN CALCIUM 40 MG/1
TABLET, FILM COATED ORAL
COMMUNITY
Start: 2023-01-30

## 2023-02-14 RX ORDER — KETOROLAC TROMETHAMINE 30 MG/ML
30 INJECTION, SOLUTION INTRAMUSCULAR; INTRAVENOUS ONCE
Status: COMPLETED | OUTPATIENT
Start: 2023-02-14 | End: 2023-02-14

## 2023-02-14 RX ORDER — BUSPIRONE HYDROCHLORIDE 5 MG/1
TABLET ORAL
COMMUNITY
Start: 2022-11-29 | End: 2023-02-24

## 2023-02-14 RX ADMIN — KETOROLAC TROMETHAMINE 30 MG: 30 INJECTION, SOLUTION INTRAMUSCULAR; INTRAVENOUS at 13:50

## 2023-02-14 SDOH — ECONOMIC STABILITY: INCOME INSECURITY: HOW HARD IS IT FOR YOU TO PAY FOR THE VERY BASICS LIKE FOOD, HOUSING, MEDICAL CARE, AND HEATING?: NOT HARD AT ALL

## 2023-02-14 SDOH — ECONOMIC STABILITY: HOUSING INSECURITY
IN THE LAST 12 MONTHS, WAS THERE A TIME WHEN YOU DID NOT HAVE A STEADY PLACE TO SLEEP OR SLEPT IN A SHELTER (INCLUDING NOW)?: NO

## 2023-02-14 SDOH — ECONOMIC STABILITY: FOOD INSECURITY: WITHIN THE PAST 12 MONTHS, THE FOOD YOU BOUGHT JUST DIDN'T LAST AND YOU DIDN'T HAVE MONEY TO GET MORE.: NEVER TRUE

## 2023-02-14 SDOH — ECONOMIC STABILITY: FOOD INSECURITY: WITHIN THE PAST 12 MONTHS, YOU WORRIED THAT YOUR FOOD WOULD RUN OUT BEFORE YOU GOT MONEY TO BUY MORE.: NEVER TRUE

## 2023-02-14 ASSESSMENT — PATIENT HEALTH QUESTIONNAIRE - PHQ9
1. LITTLE INTEREST OR PLEASURE IN DOING THINGS: 0
SUM OF ALL RESPONSES TO PHQ QUESTIONS 1-9: 0
SUM OF ALL RESPONSES TO PHQ QUESTIONS 1-9: 0
SUM OF ALL RESPONSES TO PHQ9 QUESTIONS 1 & 2: 0
SUM OF ALL RESPONSES TO PHQ QUESTIONS 1-9: 0
2. FEELING DOWN, DEPRESSED OR HOPELESS: 0
SUM OF ALL RESPONSES TO PHQ QUESTIONS 1-9: 0

## 2023-02-14 NOTE — PROGRESS NOTES
APSO Progress Note    Date:2/14/2023         Patient Ellen Jesus     Date of Birth:[birth year]     Age:67 y.o.     Assessment/Plan        Problem List Items Addressed This Visit          Circulatory    Chronic systolic (congestive) heart failure      Monitored by specialist- no acute findings meriting change in the plan         Relevant Medications    atorvastatin (LIPITOR) 40 MG tablet    Other Relevant Orders    CBC with Auto Differential (Completed)    Primary hypertension - Primary      Well-controlled, continue current medications, continue current treatment plan, medication adherence emphasized and lifestyle modifications recommended            Other    Former smoker      Borderline controlled, continue current medications and continue current treatment plan         Relevant Orders    CBC with Auto Differential (Completed)    Dyslipidemia      Well-controlled, continue current medications, continue current treatment plan, medication adherence emphasized and lifestyle modifications recommended         Relevant Medications    atorvastatin (LIPITOR) 40 MG tablet    Other Relevant Orders    CBC with Auto Differential (Completed)    Comprehensive Metabolic Panel (Completed)    Lipid Panel (Completed)    PATRICIO (generalized anxiety disorder)      Borderline controlled, continue current medications and continue current treatment plan         Relevant Orders    CBC with Auto Differential (Completed)     Other Visit Diagnoses       Right hip pain        XR  PT    Relevant Medications    ketorolac (TORADOL) injection 30 mg (Completed)    Other Relevant Orders    [facility]    XR HIP RIGHT (2-3 VIEWS) (Completed)    CBC with Auto Differential (Completed)    Primary osteoarthritis of right hip        Relevant Medications    ketorolac (TORADOL) injection 30 mg (Completed)    Other Relevant Orders    [facility]    XR HIP RIGHT (2-3 VIEWS) (Completed)    CBC with Auto Differential (Completed)    IFG (impaired fasting glucose)        Relevant Orders    Hemoglobin A1C (Completed)             Return in about 2 months (around 4/14/2023).    Electronically signed by Cam Lopez DO on 2/14/23       Total time spent was between Time personally spent assessing and managing the patient on the date of service: Est: 30-39 minutes (03804) mins. This included time spent reviewing the patient's medical record (e.g., recent visits, labs, and studies); seeing the patient in the office (face-to-face time); ordering medications, studies, procedures, or referrals; calling the patient or family later in the day with results and further recommendations; and documenting the visit in the medical record.     Subjective     Kaylee Westfall is a 67 y.o. female presenting today for   Chief Complaint   Patient presents with    Anxiety   .    Kaylee Westfall is a 66 y.o. female who presents for follow up of anxiety disorder. Current symptoms: none. She denies current suicidal and homicidal ideation. She complains of the following side effects from the treatment: none.    Back Pain  This is a chronic problem. The current episode started more than 1 year ago. The problem occurs intermittently. The problem has been waxing and waning since onset. The pain is present in the lumbar spine. Radiates to: right and left hips. Pertinent negatives include no leg pain, numbness, paresis, perianal numbness or tingling.   Hip Pain   The pain is present in the right hip. The quality of the pain is described as aching. The pain has been Worsening since onset. Associated symptoms include a loss of motion. Pertinent negatives include no inability to bear weight, loss of sensation, muscle weakness, numbness or tingling. Associated symptoms comments: Can't stand up straight at times. Sometimes unstable. The symptoms are aggravated by movement.   Hypertension  This is a chronic problem. The current episode started more  than 1 year ago. The problem has been gradually improving since onset. The problem is controlled. Pertinent negatives include no peripheral edema. Past treatments include ACE inhibitors and diuretics. The current treatment provides significant improvement. Review of Systems   Review of Systems   Constitutional: Negative. HENT: Negative. Eyes: Negative. Respiratory: Negative. Cardiovascular: Negative. Gastrointestinal: Negative. Endocrine: Negative. Genitourinary: Negative. Musculoskeletal: Negative. Skin: Negative. Allergic/Immunologic: Negative. Neurological: Negative. Negative for tingling and numbness. Hematological: Negative. Psychiatric/Behavioral: Negative. All other systems reviewed and are negative.     Medications     Current Outpatient Medications   Medication Sig Dispense Refill    atorvastatin (LIPITOR) 40 MG tablet       busPIRone (BUSPAR) 5 MG tablet       buPROPion (WELLBUTRIN XL) 150 MG extended release tablet TAKE THREE TABLETS BY MOUTH EVERY MORNING 270 tablet 0    albuterol sulfate HFA (PROVENTIL;VENTOLIN;PROAIR) 108 (90 Base) MCG/ACT inhaler       amLODIPine (NORVASC) 5 MG tablet       PARoxetine (PAXIL) 10 MG tablet Take 1 tablet by mouth daily 90 tablet 1    buPROPion (WELLBUTRIN SR) 150 MG extended release tablet Take 1 tablet by mouth 2 times daily Take 1 pill daily for 3 days then 1 pill twice daily 180 tablet 1    cyclobenzaprine (FLEXERIL) 10 MG tablet TAKE ONE TABLET BY MOUTH  AT BEDTIME AS NEEDED 90 tablet 0    aspirin 81 MG EC tablet Take 81 mg by mouth daily      furosemide (LASIX) 40 MG tablet Take 40 mg by mouth daily      metoprolol succinate (TOPROL XL) 25 MG extended release tablet Take 25 mg by mouth daily      lisinopril (PRINIVIL;ZESTRIL) 20 MG tablet Take 20 mg by mouth daily (Patient not taking: Reported on 2/14/2023)      nicotine (NICODERM CQ) 14 MG/24HR Place 1 patch onto the skin every 24 hours (Patient not taking: No sig reported)       No current facility-administered medications for this visit. Past History    Past Medical History:   has a past medical history of CHF (congestive heart failure) ([facility] [address] Fibromyalgia, PATRICIO (generalized anxiety disorder), Hyperkalemia, Hypertension, and Peripheral artery disease ([facility] [address] Social History:   reports that she has been smoking cigarettes. She has a 40.00 pack-year smoking history. She has never used smokeless tobacco. She reports that she does not drink alcohol and does not use drugs. Family History:   Family History   Problem Relation Age of Onset    Cancer Mother     Heart Disease Mother     Stroke Father        Surgical History:   Past Surgical History:   Procedure Laterality Date    ECTOPIC PREGNANCY SURGERY      FRACTURE SURGERY Left     ankle    TUBAL LIGATION          Physical Examination      Vitals:  /80   Wt 216 lb (98 kg)   BMI 30.13 kg/m²     Physical Exam  Vitals and nursing note reviewed. Constitutional:       General: She is not in acute distress. Appearance: Normal appearance. She is normal weight. She is not ill-appearing, toxic-appearing or diaphoretic. HENT:      Head: Normocephalic and atraumatic. Eyes:      General: No scleral icterus. Right eye: No discharge. Left eye: No discharge. Extraocular Movements: Extraocular movements intact. Conjunctiva/sclera: Conjunctivae normal.   Cardiovascular:      Rate and Rhythm: Normal rate and regular rhythm. Pulses: Normal pulses. Heart sounds: Normal heart sounds. No murmur heard. No friction rub. No gallop. Pulmonary:      Effort: Pulmonary effort is normal. No respiratory distress. Breath sounds: Normal breath sounds. No stridor. No wheezing, rhonchi or rales. Chest:      Chest wall: No tenderness. Musculoskeletal:      Lumbar back: Spasms and tenderness present. Decreased range of motion. Right hip: Tenderness present.  No deformity, lacerations, bony tenderness or crepitus. Decreased range of motion. Normal strength. Neurological:      Mental Status: She is alert and oriented to person, place, and time. Mental status is at baseline. Psychiatric:         Mood and Affect: Mood is anxious. Behavior: Behavior normal.         Thought Content: Thought content normal.         Judgment: Judgment normal.       Labs/Imaging/Diagnostics   Labs:  No results found for: CMPWITHGFR, CBCAUTODIF, TSHFT4, LABA1C, LIPIDPAN    Imaging Last 24 Hours:  XR CHEST (2 VW)  Narrative: EXAMINATION:  TWO XRAY VIEWS OF THE CHEST    11/17/2021 11:20 am    COMPARISON:  None    HISTORY:  ORDERING SYSTEM PROVIDED HISTORY: SOB (shortness of breath)  TECHNOLOGIST PROVIDED HISTORY:  See ICDM-10 code attached  Reason for Exam: SOB and weakness. Acuity: Acute  Type of Exam: Initial    FINDINGS:  The cardiomediastinal silhouette is moderately enlarged. Mild vascular congestive changes are present with faint interstitial  pulmonary edema pattern. There are small bilateral pleural effusions with associated subsegmental  bibasilar atelectasis. No evidence of pneumothorax. Impression: Cardiomegaly, with mild interstitial pulmonary edema and small bilateral  pleural effusions.

## 2023-02-16 ENCOUNTER — TELEPHONE (OUTPATIENT)
Dept: FAMILY MEDICINE CLINIC | Age: 68
End: 2023-02-16

## 2023-02-16 NOTE — TELEPHONE ENCOUNTER
No not anything that we can use daily or long term.  If she has another flare up we could use a steroid medication but we need to wait a little while before doing that

## 2023-02-16 NOTE — TELEPHONE ENCOUNTER
Patient states that the injection you gave her for the pain in her hip really helped. Is there a medication she can get that will do the same?     Please advise

## 2023-02-24 ENCOUNTER — HOSPITAL ENCOUNTER (OUTPATIENT)
Dept: GENERAL RADIOLOGY | Facility: CLINIC | Age: 68
End: 2023-02-24
Payer: MEDICARE

## 2023-02-24 ENCOUNTER — HOSPITAL ENCOUNTER (OUTPATIENT)
Facility: CLINIC | Age: 68
Discharge: HOME | End: 2023-02-24
Payer: MEDICARE

## 2023-02-24 ENCOUNTER — HOSPITAL ENCOUNTER (OUTPATIENT)
Facility: CLINIC | Age: 68
End: 2023-02-24
Payer: MEDICARE

## 2023-02-24 DIAGNOSIS — I10 ESSENTIAL HYPERTENSION: ICD-10-CM

## 2023-02-24 DIAGNOSIS — M16.11 PRIMARY OSTEOARTHRITIS OF RIGHT HIP: ICD-10-CM

## 2023-02-24 DIAGNOSIS — E78.5 DYSLIPIDEMIA: ICD-10-CM

## 2023-02-24 DIAGNOSIS — R73.01 IFG (IMPAIRED FASTING GLUCOSE): ICD-10-CM

## 2023-02-24 DIAGNOSIS — F41.1 GAD (GENERALIZED ANXIETY DISORDER): ICD-10-CM

## 2023-02-24 DIAGNOSIS — M25.551 RIGHT HIP PAIN: ICD-10-CM

## 2023-02-24 DIAGNOSIS — I50.22 CHRONIC SYSTOLIC (CONGESTIVE) HEART FAILURE (HCC): ICD-10-CM

## 2023-02-24 DIAGNOSIS — Z87.891 FORMER SMOKER: ICD-10-CM

## 2023-02-24 LAB
ABSOLUTE EOS #: 0.08 K/UL (ref 0–0.44)
ABSOLUTE IMMATURE GRANULOCYTE: 0.07 K/UL (ref 0–0.3)
ABSOLUTE LYMPH #: 1.44 K/UL (ref 1.1–3.7)
ABSOLUTE MONO #: 1.1 K/UL (ref 0.1–1.2)
ALBUMIN SERPL-MCNC: 4.7 G/DL (ref 3.5–5.2)
ALBUMIN/GLOBULIN RATIO: 1.6 (ref 1–2.5)
ALP SERPL-CCNC: 73 U/L (ref 35–104)
ALT SERPL-CCNC: 22 U/L (ref 5–33)
ANION GAP SERPL CALCULATED.3IONS-SCNC: 11 MMOL/L (ref 9–17)
AST SERPL-CCNC: 20 U/L
BASOPHILS # BLD: 1 % (ref 0–2)
BASOPHILS ABSOLUTE: 0.08 K/UL (ref 0–0.2)
BILIRUB SERPL-MCNC: 0.8 MG/DL (ref 0.3–1.2)
BUN SERPL-MCNC: 21 MG/DL (ref 8–23)
CALCIUM SERPL-MCNC: 9.9 MG/DL (ref 8.6–10.4)
CHLORIDE SERPL-SCNC: 103 MMOL/L (ref 98–107)
CHOLEST SERPL-MCNC: 129 MG/DL
CHOLESTEROL/HDL RATIO: 2.8
CO2 SERPL-SCNC: 27 MMOL/L (ref 20–31)
CREAT SERPL-MCNC: 1.26 MG/DL (ref 0.5–0.9)
EOSINOPHILS RELATIVE PERCENT: 1 % (ref 1–4)
EST. AVERAGE GLUCOSE BLD GHB EST-MCNC: 108 MG/DL
GFR SERPL CREATININE-BSD FRML MDRD: 47 ML/MIN/1.73M2
GLUCOSE SERPL-MCNC: 97 MG/DL (ref 70–99)
HBA1C MFR BLD: 5.4 % (ref 4–6)
HCT VFR BLD AUTO: 48.1 % (ref 36.3–47.1)
HDLC SERPL-MCNC: 46 MG/DL
HGB BLD-MCNC: 15.4 G/DL (ref 11.9–15.1)
IMMATURE GRANULOCYTES: 1 %
LDLC SERPL CALC-MCNC: 60 MG/DL (ref 0–130)
LYMPHOCYTES # BLD: 15 % (ref 24–43)
MCH RBC QN AUTO: 31.3 PG (ref 25.2–33.5)
MCHC RBC AUTO-ENTMCNC: 32 G/DL (ref 28.4–34.8)
MCV RBC AUTO: 97.8 FL (ref 82.6–102.9)
MONOCYTES # BLD: 12 % (ref 3–12)
NRBC AUTOMATED: 0 PER 100 WBC
PDW BLD-RTO: 12.2 % (ref 11.8–14.4)
PLATELET # BLD AUTO: 245 K/UL (ref 138–453)
PMV BLD AUTO: 10 FL (ref 8.1–13.5)
POTASSIUM SERPL-SCNC: 5 MMOL/L (ref 3.7–5.3)
PROT SERPL-MCNC: 7.6 G/DL (ref 6.4–8.3)
RBC # BLD: 4.92 M/UL (ref 3.95–5.11)
SEG NEUTROPHILS: 70 % (ref 36–65)
SEGMENTED NEUTROPHILS ABSOLUTE COUNT: 6.59 K/UL (ref 1.5–8.1)
SODIUM SERPL-SCNC: 141 MMOL/L (ref 135–144)
TRIGL SERPL-MCNC: 115 MG/DL
WBC # BLD AUTO: 9.4 K/UL (ref 3.5–11.3)

## 2023-02-24 PROCEDURE — 85025 COMPLETE CBC W/AUTO DIFF WBC: CPT

## 2023-02-24 PROCEDURE — 83036 HEMOGLOBIN GLYCOSYLATED A1C: CPT

## 2023-02-24 PROCEDURE — 80053 COMPREHEN METABOLIC PANEL: CPT

## 2023-02-24 PROCEDURE — 80061 LIPID PANEL: CPT

## 2023-02-24 PROCEDURE — 73502 X-RAY EXAM HIP UNI 2-3 VIEWS: CPT

## 2023-02-24 PROCEDURE — 36415 COLL VENOUS BLD VENIPUNCTURE: CPT

## 2023-02-24 RX ORDER — BUSPIRONE HYDROCHLORIDE 5 MG/1
TABLET ORAL
Qty: 180 TABLET | Refills: 3 | Status: SHIPPED | OUTPATIENT
Start: 2023-02-24

## 2023-02-24 NOTE — TELEPHONE ENCOUNTER
Gay Burnham is calling to request a refill on the following medication(s):    Medication Request:  Requested Prescriptions     Pending Prescriptions Disp Refills    busPIRone (BUSPAR) 5 MG tablet [Pharmacy Med Name: busPIRone HCL 5 MG TABLET] 180 tablet      Sig: TAKE ONE TABLET BY MOUTH TWICE A DAY       Last Visit Date (If Applicable):  3/77/2334    Next Visit Date:    4/14/2023

## 2023-02-27 ASSESSMENT — ENCOUNTER SYMPTOMS
EYES NEGATIVE: 1
ALLERGIC/IMMUNOLOGIC NEGATIVE: 1
RESPIRATORY NEGATIVE: 1
GASTROINTESTINAL NEGATIVE: 1

## 2023-02-28 NOTE — ASSESSMENT & PLAN NOTE
Borderline controlled, continue current medications and continue current treatment plan HISTORY OF PRESENT ILLNESS:  The patient is a 61 year old female who presents for 6 month follow up for type 2 diabetes and hypertension. She feels well. Her weight is down 7 pounds. She is compliant with her medications. She notes no side effects.  New concerns are none.    ALLERGIES:  No Known Allergies  Patient Active Problem List   Diagnosis   • Type 2 diabetes mellitus without complication, without long-term current use of insulin (CMS/[facility])   • Pure hyperglyceridemia   • Essential hypertension   • Allergic conjunctivitis of both eyes   • Presbyopia OU     Current Medications    ASPIRIN 81 MG TABLET    Take 81 mg by mouth daily.    CHOLECALCIFEROL (VITAMIN D3) 1000 UNITS TABLET    Take 5,000 Units by mouth daily.     GLUCOSE BLOOD (BLOOD GLUCOSE TEST STRIPS) STRIP    Test blood glucose once daily.  DX E11.9    OMEGA-3 FATTY ACIDS (OMEGA 3 PO)    Take 1 capsule by mouth daily.    UNABLE TO FIND    Take 1 capsule by mouth daily. Lactobacillus/Acidophillus     Social History     Social History   • Marital status:      Spouse name: Trevor   • Number of children: 3   • Years of education: 17     Occupational History   • [employer]       [employer]     Social History Main Topics   • Smoking status: Never Smoker   • Smokeless tobacco: Never Used   • Alcohol use 0.0 oz/week     0 Standard drinks or equivalent per week      Comment: \"occasionally\"   • Drug use: No   • Sexual activity: Not Asked     Other Topics Concern   •  Service No   • Blood Transfusions No   • Caffeine Concern Yes   • Occupational Exposure No   • Hobby Hazards No   • Sleep Concern No   • Stress Concern Yes   • Weight Concern Yes   • Special Diet Yes     low carb   • Back Care No   • Exercise Yes     walk   • Bike Helmet No   • Seat Belt Yes   • Self-Exams Yes     Social History Narrative     I have reviewed and updated where needed the patient's allergies, medications, social and family histories.    Patient's  last eye exam was August 2016    LABORATORY DATA:  Hemoglobin A1C (%)   Date Value   05/26/2017 6.2 (H)   11/28/2016 6.5 (H)     CHOLESTEROL (mg/dL)   Date Value   11/28/2016 99 (L)   11/30/2015 153     HDL (mg/dL)   Date Value   11/28/2016 29 (L)   11/30/2015 46     CALCULATED LDL (mg/dL)   Date Value   11/28/2016 49   11/30/2015 90     TRIGLYCERIDE (mg/dL)   Date Value   11/28/2016 105   11/30/2015 87     No components found for: DLDL  Creatinine (mg/dL)   Date Value   11/28/2016 0.80   11/30/2015 0.96 (H)     MICROALBUMIN, UA (TTL) (mg/dL)   Date Value   05/26/2017 <0.50   05/27/2016 <0.50     MICROALBUMIN/CREATININE (mcg/mg)   Date Value   05/26/2017     UNABLE TO CALCULATE DUE TO LOW ANALYTE CONCENTRATION.   05/27/2016     UNABLE TO CALCULATE DUE TO LOW ANALYTE CONCENTRATION.         PHYSICAL EXAMINATION:  GENERAL: she is alert and pleasant.  Visit Vitals   • /72   • Pulse 64   • Ht 5' 7\" (1.702 m)   • Wt 103.3 kg   • LMP 05/23/2016 (Approximate)   • BMI 35.66 kg/m2     HEENT: TMs and canals are clear. Conjunctivae clear. Throat is clear.   NECK: No cervical adenopathy. Thyroid not enlarged.   LUNGS: Clear to auscultation.   HEART: Regular rate and rhythm with no murmur.   ABDOMEN: Soft, without masses. No bruits.   Diabetic Foot Exam Documentation     Not performed      ASSESSMENT:  1. Type 2 diabetes mellitus without complication, without long-term current use of insulin (CMS/[facility]) To goal    2. Essential hypertension To goal    3. Pure hyperglyceridemia        PLAN:   Orders Placed This Encounter   • Glycohemoglobin   • fenofibrate 160 MG tablet   • pioglitazone-metformin (ACTOPLUS MET)  MG per tablet   • simvastatin (ZOCOR) 5 MG tablet   • carvedilol (COREG) 3.125 MG tablet       She did bring in lab work from biometric screening at work from March 13, 2017. Total cholesterol was 144, HDL was 56, LDL 69 and triglycerides 94 A1c was 6.9 TSH was 2.99 liver function tests were normal. Creatinine was  0.92.    Return in about 6 months (around 11/30/2017).

## 2023-05-07 DIAGNOSIS — F41.1 GAD (GENERALIZED ANXIETY DISORDER): ICD-10-CM

## 2023-05-07 DIAGNOSIS — Z71.6 ENCOUNTER FOR SMOKING CESSATION COUNSELING: ICD-10-CM

## 2023-05-08 RX ORDER — BUPROPION HYDROCHLORIDE 150 MG/1
TABLET ORAL
Qty: 270 TABLET | Refills: 0 | Status: SHIPPED | OUTPATIENT
Start: 2023-05-08

## 2023-05-08 NOTE — TELEPHONE ENCOUNTER
Filiberto Ramirez is calling to request a refill on the following medication(s):    Last Visit Date (If Applicable):  8/24/9086    Next Visit Date:    8/7/2023    Medication Request:  Requested Prescriptions     Pending Prescriptions Disp Refills    buPROPion (WELLBUTRIN XL) 150 MG extended release tablet [Pharmacy Med Name: buPROPion HCL  MG TABLET] 270 tablet 0     Sig: TAKE THREE TABLETS BY MOUTH EVERY MORNING

## 2023-05-25 DIAGNOSIS — F41.1 GAD (GENERALIZED ANXIETY DISORDER): ICD-10-CM

## 2023-05-25 RX ORDER — PAROXETINE 10 MG/1
TABLET, FILM COATED ORAL
Qty: 90 TABLET | Refills: 1 | Status: SHIPPED | OUTPATIENT
Start: 2023-05-25

## 2023-05-25 NOTE — TELEPHONE ENCOUNTER
Shelby Waterman is calling to request a refill on the following medication(s):    Medication Request:  Requested Prescriptions     Pending Prescriptions Disp Refills    PARoxetine (PAXIL) 10 MG tablet [Pharmacy Med Name: PARoxetine HCL 10MG TABLET] 90 tablet 1     Sig: TAKE ONE TABLET BY MOUTH DAILY       Last Visit Date (If Applicable):  0/81/5082    Next Visit Date:    8/7/2023

## 2023-07-17 ENCOUNTER — TELEPHONE (OUTPATIENT)
Dept: FAMILY MEDICINE CLINIC | Age: 68
End: 2023-07-17

## 2023-07-17 NOTE — TELEPHONE ENCOUNTER
----- Message from Shubham Aleman sent at 7/17/2023  1:24 PM EDT -----  Subject: Appointment Request    Reason for Call: New Patient/New to Provider Appointment needed: Routine   Existing Condition Follow Up    QUESTIONS    Reason for appointment request? Available appointments did not meet   patient need     Additional Information for Provider? pt needs an in person appt in the   afternoon   ---------------------------------------------------------------------------  --------------  Huong Marine Scar  [phone]; OK to leave message on voicemail  ---------------------------------------------------------------------------  --------------  SCRIPT ANSWERS

## 2023-07-18 NOTE — TELEPHONE ENCOUNTER
VISHNUOR to return my call and let her know she has an reg't on 08/07/23 unless she needs a different date    Vidya Player

## 2023-08-05 DIAGNOSIS — F41.1 GAD (GENERALIZED ANXIETY DISORDER): ICD-10-CM

## 2023-08-05 DIAGNOSIS — Z71.6 ENCOUNTER FOR SMOKING CESSATION COUNSELING: ICD-10-CM

## 2023-08-07 ENCOUNTER — OFFICE VISIT (OUTPATIENT)
Dept: FAMILY MEDICINE CLINIC | Age: 68
End: 2023-08-07
Payer: MEDICARE

## 2023-08-07 VITALS
DIASTOLIC BLOOD PRESSURE: 80 MMHG | BODY MASS INDEX: 30.4 KG/M2 | WEIGHT: 218 LBS | OXYGEN SATURATION: 95 % | HEART RATE: 73 BPM | SYSTOLIC BLOOD PRESSURE: 130 MMHG

## 2023-08-07 DIAGNOSIS — I10 PRIMARY HYPERTENSION: ICD-10-CM

## 2023-08-07 DIAGNOSIS — Z00.00 INITIAL MEDICARE ANNUAL WELLNESS VISIT: Primary | ICD-10-CM

## 2023-08-07 DIAGNOSIS — M25.551 CHRONIC RIGHT HIP PAIN: ICD-10-CM

## 2023-08-07 DIAGNOSIS — G89.29 CHRONIC RIGHT HIP PAIN: ICD-10-CM

## 2023-08-07 PROCEDURE — 1123F ACP DISCUSS/DSCN MKR DOCD: CPT | Performed by: FAMILY MEDICINE

## 2023-08-07 PROCEDURE — 3079F DIAST BP 80-89 MM HG: CPT | Performed by: FAMILY MEDICINE

## 2023-08-07 PROCEDURE — 3075F SYST BP GE 130 - 139MM HG: CPT | Performed by: FAMILY MEDICINE

## 2023-08-07 PROCEDURE — 3017F COLORECTAL CA SCREEN DOC REV: CPT | Performed by: FAMILY MEDICINE

## 2023-08-07 PROCEDURE — G0438 PPPS, INITIAL VISIT: HCPCS | Performed by: FAMILY MEDICINE

## 2023-08-07 RX ORDER — BUPROPION HYDROCHLORIDE 150 MG/1
TABLET ORAL
Qty: 270 TABLET | Refills: 3 | Status: SHIPPED | OUTPATIENT
Start: 2023-08-07 | End: 2023-08-07

## 2023-08-07 RX ORDER — KETOROLAC TROMETHAMINE 10 MG/1
10 TABLET, FILM COATED ORAL EVERY 6 HOURS PRN
Qty: 30 TABLET | Refills: 0 | Status: SHIPPED | OUTPATIENT
Start: 2023-08-07

## 2023-08-07 ASSESSMENT — PATIENT HEALTH QUESTIONNAIRE - PHQ9
SUM OF ALL RESPONSES TO PHQ9 QUESTIONS 1 & 2: 0
SUM OF ALL RESPONSES TO PHQ QUESTIONS 1-9: 0
1. LITTLE INTEREST OR PLEASURE IN DOING THINGS: 0
2. FEELING DOWN, DEPRESSED OR HOPELESS: 0
SUM OF ALL RESPONSES TO PHQ QUESTIONS 1-9: 0

## 2023-08-07 ASSESSMENT — LIFESTYLE VARIABLES
HOW OFTEN DO YOU HAVE A DRINK CONTAINING ALCOHOL: NEVER
HOW MANY STANDARD DRINKS CONTAINING ALCOHOL DO YOU HAVE ON A TYPICAL DAY: PATIENT DOES NOT DRINK

## 2023-08-07 NOTE — TELEPHONE ENCOUNTER
Ivet Barboza is calling to request a refill on the following medication(s):    Medication Request:  Requested Prescriptions     Pending Prescriptions Disp Refills    buPROPion (WELLBUTRIN XL) 150 MG extended release tablet [Pharmacy Med Name: buPROPion HCL  MG TABLET] 270 tablet 0     Sig: TAKE THREE TABLETS BY MOUTH EVERY MORNING       Last Visit Date (If Applicable):  0/58/7134    Next Visit Date:    8/7/2023

## 2023-09-05 ENCOUNTER — TELEPHONE (OUTPATIENT)
Dept: FAMILY MEDICINE CLINIC | Age: 68
End: 2023-09-05

## 2023-09-05 DIAGNOSIS — M25.551 CHRONIC RIGHT HIP PAIN: Primary | ICD-10-CM

## 2023-09-05 DIAGNOSIS — G89.29 CHRONIC RIGHT HIP PAIN: Primary | ICD-10-CM

## 2023-09-05 RX ORDER — GABAPENTIN 100 MG/1
100 CAPSULE ORAL NIGHTLY
Qty: 30 CAPSULE | Refills: 0 | Status: SHIPPED | OUTPATIENT
Start: 2023-09-05 | End: 2023-10-05

## 2023-09-05 NOTE — TELEPHONE ENCOUNTER
Patient is taking Tylenol Arthritis not helping anymore,  Bupropion HCL  mg gives her the shakes and  sweats. Pain is terrible.     [address] [facility] [address]

## 2023-10-02 DIAGNOSIS — M25.551 CHRONIC RIGHT HIP PAIN: ICD-10-CM

## 2023-10-02 DIAGNOSIS — G89.29 CHRONIC RIGHT HIP PAIN: ICD-10-CM

## 2023-10-02 RX ORDER — GABAPENTIN 100 MG/1
100 CAPSULE ORAL NIGHTLY
Qty: 90 CAPSULE | Refills: 1 | Status: SHIPPED | OUTPATIENT
Start: 2023-10-02 | End: 2024-03-30

## 2023-10-02 NOTE — TELEPHONE ENCOUNTER
Iva Arora is calling to request a refill on the following medication(s):    Medication Request:  Requested Prescriptions     Pending Prescriptions Disp Refills    gabapentin (NEURONTIN) 100 MG capsule [Pharmacy Med Name: GABAPENTIN 100 MG CAPSULE] 30 capsule 0     Sig: Take 1 capsule by mouth.        Last Visit Date (If Applicable):  5/0/4930    Next Visit Date:    2/7/2024

## 2023-10-03 DIAGNOSIS — M79.7 FIBROMYALGIA: ICD-10-CM

## 2023-10-04 RX ORDER — CYCLOBENZAPRINE HCL 10 MG
TABLET ORAL
Qty: 90 TABLET | Refills: 0 | Status: SHIPPED | OUTPATIENT
Start: 2023-10-04

## 2023-10-04 NOTE — TELEPHONE ENCOUNTER
Roel Colorado is calling to request a refill on the following medication(s):    Last Visit Date (If Applicable):  7/3/8532    Next Visit Date:    2/7/2024    Medication Request:  Requested Prescriptions     Pending Prescriptions Disp Refills    cyclobenzaprine (FLEXERIL) 10 MG tablet [Pharmacy Med Name: CYCLOBENZAPRINE 10 MG TABLET] 90 tablet 0     Sig: TAKE ONE TABLET BY MOUTH AT BEDTIME AS NEEDED

## 2023-11-13 ENCOUNTER — TELEPHONE (OUTPATIENT)
Dept: FAMILY MEDICINE CLINIC | Age: 68
End: 2023-11-13

## 2024-01-02 DIAGNOSIS — M79.7 FIBROMYALGIA: ICD-10-CM

## 2024-01-03 RX ORDER — CYCLOBENZAPRINE HCL 10 MG
TABLET ORAL
Qty: 90 TABLET | Refills: 0 | Status: SHIPPED | OUTPATIENT
Start: 2024-01-03

## 2024-01-03 NOTE — TELEPHONE ENCOUNTER
Kaylee Westfall is calling to request a refill on the following medication(s):    Medication Request:  Requested Prescriptions     Pending Prescriptions Disp Refills    cyclobenzaprine (FLEXERIL) 10 MG tablet [Pharmacy Med Name: CYCLOBENZAPRINE 10 MG TABLET] 90 tablet 0     Sig: TAKE ONE TABLET BY MOUTH AT BEDTIME AS NEEDED       Last Visit Date (If Applicable):  8/7/2023    Next Visit Date:    2/7/2024

## 2024-02-07 ENCOUNTER — OFFICE VISIT (OUTPATIENT)
Dept: FAMILY MEDICINE CLINIC | Age: 69
End: 2024-02-07
Payer: MEDICARE

## 2024-02-07 ENCOUNTER — HOSPITAL ENCOUNTER (OUTPATIENT)
Age: 69
Setting detail: SPECIMEN
Discharge: HOME | End: 2024-02-07

## 2024-02-07 VITALS
HEART RATE: 76 BPM | DIASTOLIC BLOOD PRESSURE: 78 MMHG | WEIGHT: 226 LBS | SYSTOLIC BLOOD PRESSURE: 146 MMHG | BODY MASS INDEX: 31.52 KG/M2 | OXYGEN SATURATION: 95 %

## 2024-02-07 DIAGNOSIS — I50.22 CHRONIC SYSTOLIC (CONGESTIVE) HEART FAILURE (HCC): ICD-10-CM

## 2024-02-07 DIAGNOSIS — M16.11 PRIMARY OSTEOARTHRITIS OF RIGHT HIP: ICD-10-CM

## 2024-02-07 DIAGNOSIS — E78.5 DYSLIPIDEMIA: ICD-10-CM

## 2024-02-07 DIAGNOSIS — R73.01 IFG (IMPAIRED FASTING GLUCOSE): ICD-10-CM

## 2024-02-07 DIAGNOSIS — G47.00 INSOMNIA, UNSPECIFIED TYPE: ICD-10-CM

## 2024-02-07 DIAGNOSIS — I10 PRIMARY HYPERTENSION: Primary | ICD-10-CM

## 2024-02-07 DIAGNOSIS — G89.29 CHRONIC RIGHT HIP PAIN: ICD-10-CM

## 2024-02-07 DIAGNOSIS — M25.551 CHRONIC RIGHT HIP PAIN: ICD-10-CM

## 2024-02-07 LAB
ALBUMIN SERPL-MCNC: 4.5 G/DL (ref 3.5–5.2)
ALBUMIN/GLOB SERPL: 1.4 {RATIO} (ref 1–2.5)
ALP SERPL-CCNC: 75 U/L (ref 35–104)
ALT SERPL-CCNC: 26 U/L (ref 5–33)
ANION GAP SERPL CALCULATED.3IONS-SCNC: 12 MMOL/L (ref 9–17)
AST SERPL-CCNC: 18 U/L
BASOPHILS # BLD: 0.08 K/UL (ref 0–0.2)
BASOPHILS NFR BLD: 1 % (ref 0–2)
BILIRUB SERPL-MCNC: 0.4 MG/DL (ref 0.3–1.2)
BUN SERPL-MCNC: 12 MG/DL (ref 8–23)
CALCIUM SERPL-MCNC: 10.3 MG/DL (ref 8.6–10.4)
CHLORIDE SERPL-SCNC: 102 MMOL/L (ref 98–107)
CHOLEST SERPL-MCNC: 122 MG/DL
CHOLESTEROL/HDL RATIO: 2.9
CO2 SERPL-SCNC: 25 MMOL/L (ref 20–31)
CREAT SERPL-MCNC: 0.7 MG/DL (ref 0.5–0.9)
EOSINOPHIL # BLD: 0.11 K/UL (ref 0–0.44)
EOSINOPHILS RELATIVE PERCENT: 1 % (ref 1–4)
ERYTHROCYTE [DISTWIDTH] IN BLOOD BY AUTOMATED COUNT: 12.6 % (ref 11.8–14.4)
EST. AVERAGE GLUCOSE BLD GHB EST-MCNC: 114 MG/DL
GFR SERPL CREATININE-BSD FRML MDRD: >60 ML/MIN/1.73M2
GLUCOSE SERPL-MCNC: 101 MG/DL (ref 70–99)
HBA1C MFR BLD: 5.6 % (ref 4–6)
HCT VFR BLD AUTO: 49.3 % (ref 36.3–47.1)
HDLC SERPL-MCNC: 42 MG/DL
HGB BLD-MCNC: 15.8 G/DL (ref 11.9–15.1)
IMM GRANULOCYTES # BLD AUTO: 0.08 K/UL (ref 0–0.3)
IMM GRANULOCYTES NFR BLD: 1 %
LDLC SERPL CALC-MCNC: 56 MG/DL (ref 0–130)
LYMPHOCYTES NFR BLD: 2.85 K/UL (ref 1.1–3.7)
LYMPHOCYTES RELATIVE PERCENT: 24 % (ref 24–43)
MCH RBC QN AUTO: 30.8 PG (ref 25.2–33.5)
MCHC RBC AUTO-ENTMCNC: 32 G/DL (ref 28.4–34.8)
MCV RBC AUTO: 96.1 FL (ref 82.6–102.9)
MONOCYTES NFR BLD: 0.89 K/UL (ref 0.1–1.2)
MONOCYTES NFR BLD: 8 % (ref 3–12)
NEUTROPHILS NFR BLD: 65 % (ref 36–65)
NEUTS SEG NFR BLD: 7.92 K/UL (ref 1.5–8.1)
NRBC BLD-RTO: 0 PER 100 WBC
PLATELET # BLD AUTO: 274 K/UL (ref 138–453)
PMV BLD AUTO: 10.4 FL (ref 8.1–13.5)
POTASSIUM SERPL-SCNC: 4.4 MMOL/L (ref 3.7–5.3)
PROT SERPL-MCNC: 7.8 G/DL (ref 6.4–8.3)
RBC # BLD AUTO: 5.13 M/UL (ref 3.95–5.11)
SODIUM SERPL-SCNC: 139 MMOL/L (ref 135–144)
TRIGL SERPL-MCNC: 120 MG/DL
WBC OTHER # BLD: 11.9 K/UL (ref 3.5–11.3)

## 2024-02-07 PROCEDURE — 3078F DIAST BP <80 MM HG: CPT | Performed by: FAMILY MEDICINE

## 2024-02-07 PROCEDURE — 99214 OFFICE O/P EST MOD 30 MIN: CPT | Performed by: FAMILY MEDICINE

## 2024-02-07 PROCEDURE — G8400 PT W/DXA NO RESULTS DOC: HCPCS | Performed by: FAMILY MEDICINE

## 2024-02-07 PROCEDURE — G8417 CALC BMI ABV UP PARAM F/U: HCPCS | Performed by: FAMILY MEDICINE

## 2024-02-07 PROCEDURE — 1090F PRES/ABSN URINE INCON ASSESS: CPT | Performed by: FAMILY MEDICINE

## 2024-02-07 PROCEDURE — 3017F COLORECTAL CA SCREEN DOC REV: CPT | Performed by: FAMILY MEDICINE

## 2024-02-07 PROCEDURE — 1036F TOBACCO NON-USER: CPT | Performed by: FAMILY MEDICINE

## 2024-02-07 PROCEDURE — G8484 FLU IMMUNIZE NO ADMIN: HCPCS | Performed by: FAMILY MEDICINE

## 2024-02-07 PROCEDURE — 3077F SYST BP >= 140 MM HG: CPT | Performed by: FAMILY MEDICINE

## 2024-02-07 PROCEDURE — 1123F ACP DISCUSS/DSCN MKR DOCD: CPT | Performed by: FAMILY MEDICINE

## 2024-02-07 PROCEDURE — G8427 DOCREV CUR MEDS BY ELIG CLIN: HCPCS | Performed by: FAMILY MEDICINE

## 2024-02-07 RX ORDER — SACUBITRIL AND VALSARTAN 49; 51 MG/1; MG/1
TABLET, FILM COATED ORAL
COMMUNITY
Start: 2023-12-19

## 2024-02-07 RX ORDER — FUROSEMIDE 40 MG/1
TABLET ORAL
COMMUNITY
Start: 2023-11-18

## 2024-02-07 RX ORDER — TRAMADOL HYDROCHLORIDE 50 MG/1
50 TABLET ORAL 2 TIMES DAILY PRN
Qty: 60 TABLET | Refills: 0 | Status: SHIPPED | OUTPATIENT
Start: 2024-02-07 | End: 2024-03-08

## 2024-02-07 ASSESSMENT — ENCOUNTER SYMPTOMS
ORTHOPNEA: 0
BLURRED VISION: 0
GASTROINTESTINAL NEGATIVE: 1
ALLERGIC/IMMUNOLOGIC NEGATIVE: 1
CHANGE IN BOWEL HABIT: 0
SHORTNESS OF BREATH: 0
EYES NEGATIVE: 1
ABDOMINAL PAIN: 0
RESPIRATORY NEGATIVE: 1

## 2024-02-07 ASSESSMENT — PATIENT HEALTH QUESTIONNAIRE - PHQ9
SUM OF ALL RESPONSES TO PHQ QUESTIONS 1-9: 0
SUM OF ALL RESPONSES TO PHQ9 QUESTIONS 1 & 2: 0
SUM OF ALL RESPONSES TO PHQ QUESTIONS 1-9: 0
2. FEELING DOWN, DEPRESSED OR HOPELESS: 0
1. LITTLE INTEREST OR PLEASURE IN DOING THINGS: 0

## 2024-02-07 NOTE — PROGRESS NOTES
APSO Progress Note    Date:2/7/2024         Patient Name:Kaylee Westfall     YOB: 1955     Age:68 y.o.    Assessment/Plan        Problem List Items Addressed This Visit          Circulatory    Chronic systolic (congestive) heart failure      Monitored by specialist- no acute findings meriting change in the plan         Relevant Medications    furosemide (LASIX) 40 MG tablet    Other Relevant Orders    CBC with Auto Differential    Primary hypertension - Primary      Borderline controlled, continue current medications, continue current treatment plan, medication adherence emphasized, and lifestyle modifications recommended         Relevant Medications    ENTRESTO 49-51 MG per tablet    furosemide (LASIX) 40 MG tablet       Musculoskeletal and Integument    Primary osteoarthritis of right hip      Uncontrolled, changes made today: short term low dose tramadol prn, refer to hip specialist          Relevant Medications    traMADol (ULTRAM) 50 MG tablet    Other Relevant Orders    Pool Young DO, Orthopaedic Surgery, [facility]       Other    Dyslipidemia      Unclear control, continue current plan pending work up below, medication adherence emphasized, and lifestyle modifications recommended         Relevant Orders    Comprehensive Metabolic Panel    Lipid Panel    Insomnia      Uncontrolled, changes made today: trial suvorexant         Relevant Medications    Suvorexant 10 MG TABS    Chronic right hip pain      Uncontrolled, changes made today: short term low dose tramadol prn, refer to hip specialist         Relevant Medications    traMADol (ULTRAM) 50 MG tablet    Other Relevant Orders    Pool Young DO, Orthopaedic Surgery, [facility]     Other Visit Diagnoses       IFG (impaired fasting glucose)        Relevant Orders    Hemoglobin A1C             Return in about 3 months (around 5/7/2024).    Electronically signed by Cam Lopez DO on 2/7/24       Total time spent

## 2024-02-07 NOTE — ASSESSMENT & PLAN NOTE
Unclear control, continue current plan pending work up below, medication adherence emphasized, and lifestyle modifications recommended

## 2024-02-10 SDOH — HEALTH STABILITY: PHYSICAL HEALTH: ON AVERAGE, HOW MANY DAYS PER WEEK DO YOU ENGAGE IN MODERATE TO STRENUOUS EXERCISE (LIKE A BRISK WALK)?: 0 DAYS

## 2024-02-12 DIAGNOSIS — M16.11 PRIMARY OSTEOARTHRITIS OF RIGHT HIP: Primary | ICD-10-CM

## 2024-02-15 NOTE — PROGRESS NOTES
surgery.  I do think that a walker may be a better option so she can stand more upright.  Patient states she has 1 at home.  A physical therapy prescription was given.  She will continue using Tylenol and tramadol for the pain.  We did discuss the risks and benefits of a total hip arthroplasty.  I answered the patient the patient's questions.  Patient should return to the clinic in 8 weeks to follow up with Dr. Pool Conte, DO to discuss a total hip arthroplasty.  She will need x-rays of her left hip at that visit. The patient will call the office immediately with any problems.      No orders of the defined types were placed in this encounter.      Orders Placed This Encounter   Procedures    [facility]     Referral Priority:   Routine     Referral Type:   Eval and Treat     Referral Reason:   Specialty Services Required     Requested Specialty:   Physical Therapist     Number of Visits Requested:   1       This note is created with the assistance of a speech recognition program.  While intending to generate a document that actually reflects the content of the visit, the document can still have some errors including those of syntax and sound a like substitutions which may escape proof reading.  In such instances, actual meaning can be extrapolated by contextual diversion      Electronically signed by Tammy Otero PA-C, on 2/26/2024 at 1:49 PM

## 2024-02-26 ENCOUNTER — OFFICE VISIT (OUTPATIENT)
Dept: ORTHOPEDIC SURGERY | Age: 69
End: 2024-02-26
Payer: MEDICARE

## 2024-02-26 VITALS — BODY MASS INDEX: 31.36 KG/M2 | OXYGEN SATURATION: 98 % | WEIGHT: 224 LBS | HEIGHT: 71 IN | RESPIRATION RATE: 16 BRPM

## 2024-02-26 DIAGNOSIS — M16.12 PRIMARY OSTEOARTHRITIS OF LEFT HIP: ICD-10-CM

## 2024-02-26 DIAGNOSIS — M16.11 PRIMARY OSTEOARTHRITIS OF RIGHT HIP: Primary | ICD-10-CM

## 2024-02-26 PROCEDURE — G8417 CALC BMI ABV UP PARAM F/U: HCPCS | Performed by: PHYSICIAN ASSISTANT

## 2024-02-26 PROCEDURE — 1090F PRES/ABSN URINE INCON ASSESS: CPT | Performed by: PHYSICIAN ASSISTANT

## 2024-02-26 PROCEDURE — 3017F COLORECTAL CA SCREEN DOC REV: CPT | Performed by: PHYSICIAN ASSISTANT

## 2024-02-26 PROCEDURE — G8484 FLU IMMUNIZE NO ADMIN: HCPCS | Performed by: PHYSICIAN ASSISTANT

## 2024-02-26 PROCEDURE — 1123F ACP DISCUSS/DSCN MKR DOCD: CPT | Performed by: PHYSICIAN ASSISTANT

## 2024-02-26 PROCEDURE — 99204 OFFICE O/P NEW MOD 45 MIN: CPT | Performed by: PHYSICIAN ASSISTANT

## 2024-02-26 PROCEDURE — G8400 PT W/DXA NO RESULTS DOC: HCPCS | Performed by: PHYSICIAN ASSISTANT

## 2024-02-26 PROCEDURE — G8428 CUR MEDS NOT DOCUMENT: HCPCS | Performed by: PHYSICIAN ASSISTANT

## 2024-02-26 PROCEDURE — 1036F TOBACCO NON-USER: CPT | Performed by: PHYSICIAN ASSISTANT

## 2024-02-26 ASSESSMENT — ENCOUNTER SYMPTOMS
DIARRHEA: 0
VOMITING: 0
COLOR CHANGE: 0
CONSTIPATION: 0
NAUSEA: 0
RESPIRATORY NEGATIVE: 1
SHORTNESS OF BREATH: 0
CHEST TIGHTNESS: 0
COUGH: 0
APNEA: 0
GASTROINTESTINAL NEGATIVE: 1
ABDOMINAL DISTENTION: 0
ABDOMINAL PAIN: 0

## 2024-05-14 DIAGNOSIS — M25.551 CHRONIC RIGHT HIP PAIN: ICD-10-CM

## 2024-05-14 DIAGNOSIS — G89.29 CHRONIC RIGHT HIP PAIN: ICD-10-CM

## 2024-05-14 RX ORDER — TRAMADOL HYDROCHLORIDE 50 MG/1
50 TABLET ORAL
COMMUNITY
Start: 2024-05-10 | End: 2024-05-15 | Stop reason: ALTCHOICE

## 2024-05-14 NOTE — TELEPHONE ENCOUNTER
Kaylee Westfall is calling to request a refill on the following medication(s):    Medication Request:  Requested Prescriptions     Pending Prescriptions Disp Refills    traMADol (ULTRAM) 50 MG tablet 60 tablet 0     Sig: Take 1 tablet by mouth 2 times daily as needed for Pain for up to 30 days. Intended supply: 30 days. Take lowest dose possible to manage pain Max Daily Amount: 100 mg       Last Visit Date (If Applicable):  2/7/2024    Next Visit Date:    6/18/2024      Last refill 2/7/24. Prescription pending.

## 2024-05-15 RX ORDER — TRAMADOL HYDROCHLORIDE 50 MG/1
50 TABLET ORAL 2 TIMES DAILY PRN
Qty: 60 TABLET | Refills: 0 | Status: SHIPPED | OUTPATIENT
Start: 2024-05-15 | End: 2024-06-14

## 2024-05-16 ENCOUNTER — TELEPHONE (OUTPATIENT)
Dept: FAMILY MEDICINE CLINIC | Age: 69
End: 2024-05-16

## 2024-05-16 NOTE — TELEPHONE ENCOUNTER
Detail Level: Simple Pt states she did not go to the hospital. She states she is just out of breath she states that she's been taking her blood pressure medication twice a  Day ( one at morning and one at night) and that seems to help . She also states she sees her cardiologist Wednesday. Detail Level: Detailed

## 2024-05-16 NOTE — TELEPHONE ENCOUNTER
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

## 2024-08-02 DIAGNOSIS — M79.7 FIBROMYALGIA: ICD-10-CM

## 2024-08-02 DIAGNOSIS — G89.29 CHRONIC RIGHT HIP PAIN: ICD-10-CM

## 2024-08-02 DIAGNOSIS — M25.551 CHRONIC RIGHT HIP PAIN: ICD-10-CM

## 2024-08-02 NOTE — TELEPHONE ENCOUNTER
Kaylee Westfall is calling to request a refill on the following medication(s):    Medication Request:  Requested Prescriptions     Pending Prescriptions Disp Refills    traMADol (ULTRAM) 50 MG tablet [Pharmacy Med Name: traMADol HCL 50MG TABLET] 60 tablet      Sig: TAKE 1 TABLET BY MOUTH 2 TIMES A DAY AS NEEDED FOR PAIN FOR UP TO 30 DAYS. TAKE LOWEST DOSE POSSIBLE TO MANAGE PAIN. MAX DAILY AMOUNT OF 2 TABLETS    cyclobenzaprine (FLEXERIL) 10 MG tablet [Pharmacy Med Name: CYCLOBENZAPRINE 10 MG TABLET] 90 tablet 0     Sig: TAKE ONE TABLET BY MOUTH AT BEDTIME AS NEEDED       Last Visit Date (If Applicable):  2/7/2024    Next Visit Date:    8/20/2024    Last refill 1/3/24 and 5/15/24.prescription pending

## 2024-08-03 RX ORDER — CYCLOBENZAPRINE HCL 10 MG
TABLET ORAL
Qty: 90 TABLET | Refills: 0 | Status: SHIPPED | OUTPATIENT
Start: 2024-08-03

## 2024-08-03 RX ORDER — TRAMADOL HYDROCHLORIDE 50 MG/1
50 TABLET ORAL 2 TIMES DAILY
Qty: 60 TABLET | Refills: 0 | Status: SHIPPED | OUTPATIENT
Start: 2024-08-03 | End: 2024-09-02

## 2024-08-17 SDOH — ECONOMIC STABILITY: FOOD INSECURITY: WITHIN THE PAST 12 MONTHS, YOU WORRIED THAT YOUR FOOD WOULD RUN OUT BEFORE YOU GOT MONEY TO BUY MORE.: NEVER TRUE

## 2024-08-17 SDOH — ECONOMIC STABILITY: FOOD INSECURITY: WITHIN THE PAST 12 MONTHS, THE FOOD YOU BOUGHT JUST DIDN'T LAST AND YOU DIDN'T HAVE MONEY TO GET MORE.: NEVER TRUE

## 2024-08-17 SDOH — ECONOMIC STABILITY: INCOME INSECURITY: HOW HARD IS IT FOR YOU TO PAY FOR THE VERY BASICS LIKE FOOD, HOUSING, MEDICAL CARE, AND HEATING?: SOMEWHAT HARD

## 2024-08-20 ENCOUNTER — TELEMEDICINE (OUTPATIENT)
Dept: FAMILY MEDICINE CLINIC | Age: 69
End: 2024-08-20

## 2024-08-20 DIAGNOSIS — M25.551 CHRONIC HIP PAIN, BILATERAL: ICD-10-CM

## 2024-08-20 DIAGNOSIS — G89.29 CHRONIC HIP PAIN, BILATERAL: ICD-10-CM

## 2024-08-20 DIAGNOSIS — M25.551 CHRONIC RIGHT HIP PAIN: ICD-10-CM

## 2024-08-20 DIAGNOSIS — E78.5 DYSLIPIDEMIA: ICD-10-CM

## 2024-08-20 DIAGNOSIS — M16.0 PRIMARY OSTEOARTHRITIS OF BOTH HIPS: ICD-10-CM

## 2024-08-20 DIAGNOSIS — I10 PRIMARY HYPERTENSION: Primary | ICD-10-CM

## 2024-08-20 DIAGNOSIS — I50.22 CHRONIC SYSTOLIC (CONGESTIVE) HEART FAILURE (HCC): ICD-10-CM

## 2024-08-20 DIAGNOSIS — G89.29 CHRONIC RIGHT HIP PAIN: ICD-10-CM

## 2024-08-20 DIAGNOSIS — M25.552 CHRONIC HIP PAIN, BILATERAL: ICD-10-CM

## 2024-08-20 RX ORDER — TRAMADOL HYDROCHLORIDE 50 MG/1
50 TABLET ORAL EVERY 8 HOURS PRN
Qty: 60 TABLET | Refills: 0
Start: 2024-08-20 | End: 2024-09-19

## 2024-08-20 ASSESSMENT — ENCOUNTER SYMPTOMS
GASTROINTESTINAL NEGATIVE: 1
SHORTNESS OF BREATH: 0
ORTHOPNEA: 0
ALLERGIC/IMMUNOLOGIC NEGATIVE: 1
EYES NEGATIVE: 1
RESPIRATORY NEGATIVE: 1
BLURRED VISION: 0

## 2024-08-20 NOTE — ASSESSMENT & PLAN NOTE
Monitored by specialist- no acute findings meriting change in the plan  Needs b/l hip replacement

## 2024-08-20 NOTE — ASSESSMENT & PLAN NOTE
At goal, continue current medications, continue current treatment plan, medication adherence emphasized, and lifestyle modifications recommended

## 2024-08-20 NOTE — PROGRESS NOTES
Kaylee Westfall, was evaluated through a synchronous (real-time) audio-video encounter. The patient (or guardian if applicable) is aware that this is a billable service, which includes applicable co-pays. This Virtual Visit was conducted with patient's (and/or legal guardian's) consent. Patient identification was verified, and a caregiver was present when appropriate.   The patient was located at Home: [address] [facility] [address]  [place] [id number]  Provider was located at Facility (Appt Dept): [address] [place] [address] [address]  [place] [address]  Confirm you are appropriately licensed, registered, or certified to deliver care in the state where the patient is located as indicated above. If you are not or unsure, please re-schedule the visit: Yes, I confirm.     Kaylee Westfall (:  1955) is a Established patient, presenting virtually for evaluation of the following:      Below is the assessment and plan developed based on review of pertinent history, physical exam, labs, studies, and medications.     Assessment & Plan  Primary hypertension   At goal, continue current medications, continue current treatment plan, medication adherence emphasized, and lifestyle modifications recommended         Chronic systolic (congestive) heart failure   Monitored by specialist- no acute findings meriting change in the plan         Chronic hip pain, bilateral   Borderline controlled, changes made today: increase Tramadol to TID prn         Primary osteoarthritis of both hips   Monitored by specialist- no acute findings meriting change in the plan  Needs b/l hip replacement         Dyslipidemia   Unclear control, continue current plan pending work up below         Chronic right hip pain   Uncontrolled, changes made today: increase tramadol    Orders:    traMADol (ULTRAM) 50 MG tablet; Take 1 tablet by mouth every 8 hours as needed for Pain for up to 30 days. Max Daily Amount: 150 mg      Return in about 3 months (around

## 2024-08-21 DIAGNOSIS — E78.5 DYSLIPIDEMIA: ICD-10-CM

## 2024-08-21 DIAGNOSIS — I50.22 CHRONIC SYSTOLIC (CONGESTIVE) HEART FAILURE (HCC): Primary | ICD-10-CM

## 2024-08-21 DIAGNOSIS — I10 PRIMARY HYPERTENSION: ICD-10-CM

## 2024-08-21 DIAGNOSIS — R73.01 IFG (IMPAIRED FASTING GLUCOSE): ICD-10-CM

## 2024-09-03 ENCOUNTER — HOSPITAL ENCOUNTER (OUTPATIENT)
Facility: CLINIC | Age: 69
Discharge: HOME | End: 2024-09-03
Payer: MEDICARE

## 2024-09-03 DIAGNOSIS — I50.22 CHRONIC SYSTOLIC (CONGESTIVE) HEART FAILURE (HCC): ICD-10-CM

## 2024-09-03 DIAGNOSIS — E78.5 DYSLIPIDEMIA: ICD-10-CM

## 2024-09-03 DIAGNOSIS — I10 PRIMARY HYPERTENSION: ICD-10-CM

## 2024-09-03 DIAGNOSIS — R73.01 IFG (IMPAIRED FASTING GLUCOSE): ICD-10-CM

## 2024-09-03 LAB
BASOPHILS # BLD: 0.07 K/UL (ref 0–0.2)
BASOPHILS NFR BLD: 1 % (ref 0–2)
EOSINOPHIL # BLD: 0.1 K/UL (ref 0–0.44)
EOSINOPHILS RELATIVE PERCENT: 1 % (ref 1–4)
ERYTHROCYTE [DISTWIDTH] IN BLOOD BY AUTOMATED COUNT: 12.9 % (ref 11.8–14.4)
EST. AVERAGE GLUCOSE BLD GHB EST-MCNC: 114 MG/DL
HBA1C MFR BLD: 5.6 % (ref 4–6)
HCT VFR BLD AUTO: 49.8 % (ref 36.3–47.1)
HGB BLD-MCNC: 15.7 G/DL (ref 11.9–15.1)
IMM GRANULOCYTES # BLD AUTO: 0.06 K/UL (ref 0–0.3)
IMM GRANULOCYTES NFR BLD: 1 %
LYMPHOCYTES NFR BLD: 2.28 K/UL (ref 1.1–3.7)
LYMPHOCYTES RELATIVE PERCENT: 21 % (ref 24–43)
MCH RBC QN AUTO: 29.9 PG (ref 25.2–33.5)
MCHC RBC AUTO-ENTMCNC: 31.5 G/DL (ref 28.4–34.8)
MCV RBC AUTO: 94.9 FL (ref 82.6–102.9)
MONOCYTES NFR BLD: 0.95 K/UL (ref 0.1–1.2)
MONOCYTES NFR BLD: 9 % (ref 3–12)
NEUTROPHILS NFR BLD: 67 % (ref 36–65)
NEUTS SEG NFR BLD: 7.49 K/UL (ref 1.5–8.1)
NRBC BLD-RTO: 0 PER 100 WBC
PLATELET # BLD AUTO: 232 K/UL (ref 138–453)
PMV BLD AUTO: 10.1 FL (ref 8.1–13.5)
RBC # BLD AUTO: 5.25 M/UL (ref 3.95–5.11)
WBC OTHER # BLD: 11 K/UL (ref 3.5–11.3)

## 2024-09-03 PROCEDURE — 83036 HEMOGLOBIN GLYCOSYLATED A1C: CPT

## 2024-09-03 PROCEDURE — 80061 LIPID PANEL: CPT

## 2024-09-03 PROCEDURE — 85025 COMPLETE CBC W/AUTO DIFF WBC: CPT

## 2024-09-03 PROCEDURE — 80053 COMPREHEN METABOLIC PANEL: CPT

## 2024-09-03 PROCEDURE — 36415 COLL VENOUS BLD VENIPUNCTURE: CPT

## 2024-09-04 LAB
ALBUMIN SERPL-MCNC: 4.5 G/DL (ref 3.5–5.2)
ALBUMIN/GLOB SERPL: 2 {RATIO} (ref 1–2.5)
ALP SERPL-CCNC: 79 U/L (ref 35–104)
ALT SERPL-CCNC: 24 U/L (ref 10–35)
ANION GAP SERPL CALCULATED.3IONS-SCNC: 9 MMOL/L (ref 9–16)
AST SERPL-CCNC: 20 U/L (ref 10–35)
BILIRUB SERPL-MCNC: 0.5 MG/DL (ref 0–1.2)
BUN SERPL-MCNC: 13 MG/DL (ref 8–23)
CALCIUM SERPL-MCNC: 9.6 MG/DL (ref 8.6–10.4)
CHLORIDE SERPL-SCNC: 106 MMOL/L (ref 98–107)
CHOLEST SERPL-MCNC: 130 MG/DL (ref 0–199)
CHOLESTEROL/HDL RATIO: 3
CO2 SERPL-SCNC: 25 MMOL/L (ref 20–31)
CREAT SERPL-MCNC: 0.8 MG/DL (ref 0.5–0.9)
GFR, ESTIMATED: 76 ML/MIN/1.73M2
GLUCOSE SERPL-MCNC: 94 MG/DL (ref 74–99)
HDLC SERPL-MCNC: 39 MG/DL
LDLC SERPL CALC-MCNC: 67 MG/DL (ref 0–100)
POTASSIUM SERPL-SCNC: 4.6 MMOL/L (ref 3.7–5.3)
PROT SERPL-MCNC: 7.3 G/DL (ref 6.6–8.7)
SODIUM SERPL-SCNC: 140 MMOL/L (ref 136–145)
TRIGL SERPL-MCNC: 120 MG/DL
VLDLC SERPL CALC-MCNC: 24 MG/DL

## 2024-09-21 DIAGNOSIS — M25.551 CHRONIC RIGHT HIP PAIN: ICD-10-CM

## 2024-09-21 DIAGNOSIS — G89.29 CHRONIC RIGHT HIP PAIN: ICD-10-CM

## 2024-09-24 RX ORDER — TRAMADOL HYDROCHLORIDE 50 MG/1
50 TABLET ORAL 2 TIMES DAILY PRN
Qty: 60 TABLET | Refills: 0 | Status: SHIPPED | OUTPATIENT
Start: 2024-09-24 | End: 2024-10-24

## 2024-10-18 SDOH — HEALTH STABILITY: PHYSICAL HEALTH: ON AVERAGE, HOW MANY DAYS PER WEEK DO YOU ENGAGE IN MODERATE TO STRENUOUS EXERCISE (LIKE A BRISK WALK)?: 0 DAYS

## 2024-10-21 ENCOUNTER — OFFICE VISIT (OUTPATIENT)
Dept: FAMILY MEDICINE CLINIC | Age: 69
End: 2024-10-21

## 2024-10-21 VITALS
TEMPERATURE: 97.6 F | DIASTOLIC BLOOD PRESSURE: 82 MMHG | HEART RATE: 86 BPM | BODY MASS INDEX: 29.09 KG/M2 | SYSTOLIC BLOOD PRESSURE: 138 MMHG | WEIGHT: 208.6 LBS | OXYGEN SATURATION: 98 %

## 2024-10-21 DIAGNOSIS — I50.22 CHRONIC SYSTOLIC (CONGESTIVE) HEART FAILURE (HCC): ICD-10-CM

## 2024-10-21 DIAGNOSIS — M25.551 CHRONIC HIP PAIN, BILATERAL: ICD-10-CM

## 2024-10-21 DIAGNOSIS — E55.9 VITAMIN D DEFICIENCY: ICD-10-CM

## 2024-10-21 DIAGNOSIS — Z71.6 ENCOUNTER FOR SMOKING CESSATION COUNSELING: ICD-10-CM

## 2024-10-21 DIAGNOSIS — H53.8 BLURRY VISION, RIGHT EYE: ICD-10-CM

## 2024-10-21 DIAGNOSIS — G89.29 CHRONIC HIP PAIN, BILATERAL: ICD-10-CM

## 2024-10-21 DIAGNOSIS — R25.1 TREMOR: Primary | ICD-10-CM

## 2024-10-21 DIAGNOSIS — M25.552 CHRONIC HIP PAIN, BILATERAL: ICD-10-CM

## 2024-10-21 RX ORDER — PAROXETINE 10 MG/1
1 TABLET, FILM COATED ORAL EVERY MORNING
COMMUNITY
End: 2024-10-21

## 2024-10-21 RX ORDER — METOPROLOL SUCCINATE 100 MG/1
TABLET, EXTENDED RELEASE ORAL
COMMUNITY
Start: 2024-08-07

## 2024-10-21 ASSESSMENT — ENCOUNTER SYMPTOMS
ABDOMINAL PAIN: 0
BACK PAIN: 1
WHEEZING: 0
COUGH: 0
NAUSEA: 0
VOMITING: 0
COLOR CHANGE: 0
DIARRHEA: 0
CONSTIPATION: 0
SHORTNESS OF BREATH: 1

## 2024-10-21 ASSESSMENT — PATIENT HEALTH QUESTIONNAIRE - PHQ9
SUM OF ALL RESPONSES TO PHQ QUESTIONS 1-9: 0
2. FEELING DOWN, DEPRESSED OR HOPELESS: NOT AT ALL
SUM OF ALL RESPONSES TO PHQ QUESTIONS 1-9: 0
SUM OF ALL RESPONSES TO PHQ QUESTIONS 1-9: 0
1. LITTLE INTEREST OR PLEASURE IN DOING THINGS: NOT AT ALL
SUM OF ALL RESPONSES TO PHQ QUESTIONS 1-9: 0
SUM OF ALL RESPONSES TO PHQ9 QUESTIONS 1 & 2: 0

## 2024-10-21 NOTE — ASSESSMENT & PLAN NOTE
Likely side effect of medication probably tramadol, will recheck other blood work electrolytes TSH and vitamin    Orders:    Vitamin B12 & Folate; Future    Vitamin B6; Future    Vitamin D 25 Hydroxy; Future    TSH With Reflex Ft4; Future    Basic Metabolic Panel; Future    Magnesium; Future

## 2024-10-21 NOTE — ASSESSMENT & PLAN NOTE
Refer to ophthalmologist    Orders:    Vitamin D 25 Hydroxy; Future    GILBERT - Darcy Dailey MD, Ophthalmology, [place]

## 2024-10-21 NOTE — PROGRESS NOTES
Kaylee Westfall (:  1955) is a 69 y.o. female,Established patient, here for evaluation of the following chief complaint(s):  Establish Care, Eye Problem (Right Eye Foggy), and Shaking    H&P    This is 69 years old female with Hx of CHFrEF sees cardiology on aspirin and Lipitor Entresto Lasix and metoprolol, primary osteoarthritis of bilateral hip followed before with orthopedic with recommendation for total hip replacement that patient refused currently patient is on tramadol and Flexeril that was refilled by her PCP    Here for follow-up    Patient reported shakiness has been going on for a while now during rest and on exertion as well.  Patient reported no changes in the heart medication.  Reported associated symptoms of numbness of the hands and feet.  However also reported a chronic back pain due to arthritis changes.    Patient reported smoking half a pack daily.    Also reported blurry vision has been going on for weeks now of her right eye.        Menstruation: No LMP recorded. Patient is postmenopausal.     Screening for Depression: Adult population (age 18 and older)  - PHQ-9 score today:PHQ-2 Score: 0  PHQ-9 Total Score: 0  Interpretation:  5-9 mild   10-14 moderate   15-19 moderately severe   20-27 severe         10/21/2024     2:48 PM 2024     1:36 PM 2023     1:00 PM 2023     1:26 PM 2022    10:38 AM 10/12/2022    11:55 AM 2021     3:01 PM   PHQ Scores   PHQ2 Score 0 0 0 0 0 0 0   PHQ9 Score 0 0 0 0 0 0 0     Interpretation of Total Score Depression Severity: 1-4 = Minimal depression, 5-9 = Mild depression, 10-14 = Moderate depression, 15-19 = Moderately severe depression, 20-27 = Severe depression    How often pt has had thoughts of death or hurting self (if PHQ positive for depression):            No data to display              Interpretation of PATRICIO-7 score: 5-9 = mild anxiety, 10-14 = moderate anxiety, 15+ = severe anxiety. Recommend referral to behavioral health

## 2024-10-21 NOTE — ASSESSMENT & PLAN NOTE
On chronic tramadol and Flexeril will refer to pain management for further options.  Patient was seen previously by Ortho who recommended total hip replacement that patient disagree on the proceeding with    Orders:    Dima Stephenson MD, Pain Management, [place]    Vitamin D 25 Hydroxy; Future

## 2024-10-25 ENCOUNTER — HOSPITAL ENCOUNTER (OUTPATIENT)
Facility: CLINIC | Age: 69
Discharge: HOME | End: 2024-10-25
Payer: MEDICARE

## 2024-10-25 DIAGNOSIS — M25.551 CHRONIC HIP PAIN, BILATERAL: ICD-10-CM

## 2024-10-25 DIAGNOSIS — E55.9 VITAMIN D DEFICIENCY: ICD-10-CM

## 2024-10-25 DIAGNOSIS — G89.29 CHRONIC HIP PAIN, BILATERAL: ICD-10-CM

## 2024-10-25 DIAGNOSIS — R25.1 TREMOR: ICD-10-CM

## 2024-10-25 DIAGNOSIS — H53.8 BLURRY VISION, RIGHT EYE: ICD-10-CM

## 2024-10-25 DIAGNOSIS — M25.552 CHRONIC HIP PAIN, BILATERAL: ICD-10-CM

## 2024-10-25 LAB
25(OH)D3 SERPL-MCNC: 30.1 NG/ML (ref 30–100)
ANION GAP SERPL CALCULATED.3IONS-SCNC: 11 MMOL/L (ref 9–16)
BUN SERPL-MCNC: 12 MG/DL (ref 8–23)
CALCIUM SERPL-MCNC: 9.9 MG/DL (ref 8.6–10.4)
CHLORIDE SERPL-SCNC: 103 MMOL/L (ref 98–107)
CO2 SERPL-SCNC: 26 MMOL/L (ref 20–31)
CREAT SERPL-MCNC: 0.9 MG/DL (ref 0.5–0.9)
FOLATE SERPL-MCNC: 10.7 NG/ML (ref 4.8–24.2)
GFR, ESTIMATED: 74 ML/MIN/1.73M2
GLUCOSE SERPL-MCNC: 109 MG/DL (ref 74–99)
MAGNESIUM SERPL-MCNC: 2.1 MG/DL (ref 1.6–2.4)
POTASSIUM SERPL-SCNC: 4.5 MMOL/L (ref 3.7–5.3)
SODIUM SERPL-SCNC: 140 MMOL/L (ref 136–145)
TSH SERPL DL<=0.05 MIU/L-ACNC: 1.72 UIU/ML (ref 0.27–4.2)
VIT B12 SERPL-MCNC: 557 PG/ML (ref 232–1245)

## 2024-10-25 PROCEDURE — 82306 VITAMIN D 25 HYDROXY: CPT

## 2024-10-25 PROCEDURE — 84443 ASSAY THYROID STIM HORMONE: CPT

## 2024-10-25 PROCEDURE — 82607 VITAMIN B-12: CPT

## 2024-10-25 PROCEDURE — 82746 ASSAY OF FOLIC ACID SERUM: CPT

## 2024-10-25 PROCEDURE — 80048 BASIC METABOLIC PNL TOTAL CA: CPT

## 2024-10-25 PROCEDURE — 36415 COLL VENOUS BLD VENIPUNCTURE: CPT

## 2024-10-25 PROCEDURE — 83735 ASSAY OF MAGNESIUM: CPT

## 2024-10-25 PROCEDURE — 84207 ASSAY OF VITAMIN B-6: CPT

## 2024-10-30 LAB — PYRIDOXAL PHOS SERPL-SCNC: 22.5 NMOL/L (ref 20–125)

## 2024-11-05 ENCOUNTER — INITIAL CONSULT (OUTPATIENT)
Dept: PAIN MANAGEMENT | Age: 69
End: 2024-11-05

## 2024-11-05 VITALS — BODY MASS INDEX: 29.12 KG/M2 | HEIGHT: 71 IN | WEIGHT: 208 LBS

## 2024-11-05 DIAGNOSIS — M54.50 CHRONIC BILATERAL LOW BACK PAIN WITHOUT SCIATICA: ICD-10-CM

## 2024-11-05 DIAGNOSIS — M16.0 PRIMARY OSTEOARTHRITIS OF BOTH HIPS: Primary | ICD-10-CM

## 2024-11-05 DIAGNOSIS — Z79.891 CHRONIC PRESCRIPTION OPIATE USE: ICD-10-CM

## 2024-11-05 DIAGNOSIS — G89.29 CHRONIC BILATERAL LOW BACK PAIN WITHOUT SCIATICA: ICD-10-CM

## 2024-11-05 RX ORDER — TRAMADOL HYDROCHLORIDE 50 MG/1
50 TABLET ORAL DAILY PRN
Qty: 30 TABLET | Refills: 1 | Status: SHIPPED | OUTPATIENT
Start: 2024-11-05 | End: 2025-01-04

## 2024-11-05 ASSESSMENT — ENCOUNTER SYMPTOMS
VOMITING: 0
CHEST TIGHTNESS: 0
NAUSEA: 0
RESPIRATORY NEGATIVE: 1
SHORTNESS OF BREATH: 0
CONSTIPATION: 1
BACK PAIN: 1
SORE THROAT: 0
DIARRHEA: 1

## 2024-11-05 NOTE — PROGRESS NOTES
The patient is a 69 y.o.Non- / non  female.    Chief Complaint   Patient presents with    New Patient    Hip Pain     Bilateral     Back Pain        Pain History  This is a 69-year-old female complaining of chronic pain involving both hips and lower back pain with radiation down both legs  Symptoms are chronic going on for many years  Pain is constant describes it as aching throbbing sensation  All activities aggravates the pain  Reports numbness and paresthesia involving both feet  No recent physical therapy  Diagnostic workup is only an x-ray of the right hip that reported hip arthritis  No previous surgical intervention related to hip or lumbar spine  No previous interventional procedures  Patient currently taking tramadol twice a day prescribed by PCP states that the PCP do not wish to continue writing it and wants a pain clinic to take over the medication management  She reports no side effect find it helpful denies any illicit substance use  Pain score today: 7  1. Location: bilateral hips    2. Radiation: both legs  3. Character: stiff, throbbing  5. Duration: constant  6. Onset: pain has been all her life, last year or so is when its worsened  7. Did an injury cause pain: no  8. Aggravating factors: unknown  9. Alleviating factors: lying down  10. Associated symptoms (numbness / tingling / weakness): all of them   -Where at: legs  -Down into finger tips or toes (specify which finger or toes): toes, foot feet  -constant or intermitting:  constant  11. Red Flags: (weight loss / chills / loss of bladder or bowel control): no     Previous management history  1. Previous diagnostic workup: (Imaging/EMG)   CT, MRI, or Xray: xray  What part of the body: hip  What [facility]  What year or specific date: 2/2024  EMG:  years ago     2. Previous non interventional treatments tried:  chiropractor or physical therapy: chiropractor  What part of the body: back    What facility was

## 2025-01-28 NOTE — TELEPHONE ENCOUNTER
Patient notified to stop Wellbutrin and a new medication was sent to pharmacy.     Carol Mccord Name band;

## 2025-04-23 ENCOUNTER — PATIENT MESSAGE (OUTPATIENT)
Dept: FAMILY MEDICINE CLINIC | Age: 70
End: 2025-04-23

## 2025-05-05 ENCOUNTER — TELEPHONE (OUTPATIENT)
Dept: FAMILY MEDICINE CLINIC | Age: 70
End: 2025-05-05

## 2025-05-30 ENCOUNTER — TELEPHONE (OUTPATIENT)
Dept: FAMILY MEDICINE CLINIC | Age: 70
End: 2025-05-30